# Patient Record
Sex: FEMALE | Race: BLACK OR AFRICAN AMERICAN | Employment: OTHER | URBAN - METROPOLITAN AREA
[De-identification: names, ages, dates, MRNs, and addresses within clinical notes are randomized per-mention and may not be internally consistent; named-entity substitution may affect disease eponyms.]

---

## 2017-04-20 ENCOUNTER — HOSPITAL ENCOUNTER (OUTPATIENT)
Dept: GENERAL RADIOLOGY | Age: 72
Discharge: HOME | End: 2017-04-20
Payer: MEDICARE

## 2017-04-20 DIAGNOSIS — R05.9 COUGH: ICD-10-CM

## 2017-04-20 PROCEDURE — 71020 XR CHEST PA LAT: CPT

## 2017-06-15 ENCOUNTER — HOSPITAL ENCOUNTER (OUTPATIENT)
Dept: MAMMOGRAPHY | Age: 72
Discharge: HOME | End: 2017-06-15
Attending: INTERNAL MEDICINE
Payer: MEDICARE

## 2017-06-15 DIAGNOSIS — Z12.31 VISIT FOR SCREENING MAMMOGRAM: ICD-10-CM

## 2017-06-15 PROCEDURE — 77063 BREAST TOMOSYNTHESIS BI: CPT

## 2017-06-16 ENCOUNTER — HOSPITAL ENCOUNTER (EMERGENCY)
Age: 72
Discharge: HOME | End: 2017-06-16
Attending: EMERGENCY MEDICINE
Payer: MEDICARE

## 2017-06-16 ENCOUNTER — APPOINTMENT (OUTPATIENT)
Dept: CT IMAGING | Age: 72
End: 2017-06-16
Attending: EMERGENCY MEDICINE
Payer: MEDICARE

## 2017-06-16 VITALS
HEART RATE: 74 BPM | OXYGEN SATURATION: 98 % | DIASTOLIC BLOOD PRESSURE: 98 MMHG | BODY MASS INDEX: 34.73 KG/M2 | SYSTOLIC BLOOD PRESSURE: 148 MMHG | TEMPERATURE: 97.8 F | RESPIRATION RATE: 17 BRPM | WEIGHT: 196 LBS | HEIGHT: 63 IN

## 2017-06-16 DIAGNOSIS — K62.5 RECTAL BLEEDING: ICD-10-CM

## 2017-06-16 DIAGNOSIS — R03.0 ELEVATED BLOOD PRESSURE READING: Primary | ICD-10-CM

## 2017-06-16 DIAGNOSIS — K52.9 NONINFECTIOUS GASTROENTERITIS, UNSPECIFIED TYPE: ICD-10-CM

## 2017-06-16 LAB
ALBUMIN SERPL BCP-MCNC: 3.4 G/DL (ref 3.4–5)
ALBUMIN/GLOB SERPL: 0.9 {RATIO} (ref 0.8–1.7)
ALP SERPL-CCNC: 97 U/L (ref 45–117)
ALT SERPL-CCNC: 22 U/L (ref 13–56)
ANION GAP BLD CALC-SCNC: 7 MMOL/L (ref 3–18)
AST SERPL W P-5'-P-CCNC: 14 U/L (ref 15–37)
BASOPHILS # BLD AUTO: 0.1 K/UL (ref 0–0.06)
BASOPHILS # BLD: 1 % (ref 0–2)
BILIRUB SERPL-MCNC: 0.5 MG/DL (ref 0.2–1)
BUN SERPL-MCNC: 16 MG/DL (ref 7–18)
BUN/CREAT SERPL: 15 (ref 12–20)
CALCIUM SERPL-MCNC: 9 MG/DL (ref 8.5–10.1)
CHLORIDE SERPL-SCNC: 105 MMOL/L (ref 100–108)
CO2 SERPL-SCNC: 30 MMOL/L (ref 21–32)
CREAT SERPL-MCNC: 1.1 MG/DL (ref 0.6–1.3)
DIFFERENTIAL METHOD BLD: ABNORMAL
EOSINOPHIL # BLD: 0.4 K/UL (ref 0–0.4)
EOSINOPHIL NFR BLD: 3 % (ref 0–5)
ERYTHROCYTE [DISTWIDTH] IN BLOOD BY AUTOMATED COUNT: 13.2 % (ref 11.6–14.5)
GLOBULIN SER CALC-MCNC: 3.8 G/DL (ref 2–4)
GLUCOSE SERPL-MCNC: 75 MG/DL (ref 74–99)
HCT VFR BLD AUTO: 38 % (ref 35–45)
HGB BLD-MCNC: 12.5 G/DL (ref 12–16)
LYMPHOCYTES # BLD AUTO: 29 % (ref 21–52)
LYMPHOCYTES # BLD: 3.2 K/UL (ref 0.9–3.6)
MCH RBC QN AUTO: 28.6 PG (ref 24–34)
MCHC RBC AUTO-ENTMCNC: 32.9 G/DL (ref 31–37)
MCV RBC AUTO: 87 FL (ref 74–97)
MONOCYTES # BLD: 0.6 K/UL (ref 0.05–1.2)
MONOCYTES NFR BLD AUTO: 6 % (ref 3–10)
NEUTS SEG # BLD: 6.8 K/UL (ref 1.8–8)
NEUTS SEG NFR BLD AUTO: 61 % (ref 40–73)
PLATELET # BLD AUTO: 261 K/UL (ref 135–420)
PMV BLD AUTO: 9.8 FL (ref 9.2–11.8)
POTASSIUM SERPL-SCNC: 4 MMOL/L (ref 3.5–5.5)
PROT SERPL-MCNC: 7.2 G/DL (ref 6.4–8.2)
RBC # BLD AUTO: 4.37 M/UL (ref 4.2–5.3)
SODIUM SERPL-SCNC: 142 MMOL/L (ref 136–145)
WBC # BLD AUTO: 11.1 K/UL (ref 4.6–13.2)

## 2017-06-16 PROCEDURE — 99283 EMERGENCY DEPT VISIT LOW MDM: CPT

## 2017-06-16 PROCEDURE — 74177 CT ABD & PELVIS W/CONTRAST: CPT

## 2017-06-16 PROCEDURE — 96374 THER/PROPH/DIAG INJ IV PUSH: CPT

## 2017-06-16 PROCEDURE — 74011636320 HC RX REV CODE- 636/320: Performed by: EMERGENCY MEDICINE

## 2017-06-16 PROCEDURE — 85025 COMPLETE CBC W/AUTO DIFF WBC: CPT | Performed by: EMERGENCY MEDICINE

## 2017-06-16 PROCEDURE — 80053 COMPREHEN METABOLIC PANEL: CPT | Performed by: EMERGENCY MEDICINE

## 2017-06-16 PROCEDURE — 74011250636 HC RX REV CODE- 250/636: Performed by: EMERGENCY MEDICINE

## 2017-06-16 PROCEDURE — 96361 HYDRATE IV INFUSION ADD-ON: CPT

## 2017-06-16 RX ORDER — SODIUM CHLORIDE 9 MG/ML
500 INJECTION, SOLUTION INTRAVENOUS ONCE
Status: COMPLETED | OUTPATIENT
Start: 2017-06-16 | End: 2017-06-16

## 2017-06-16 RX ORDER — MORPHINE SULFATE 2 MG/ML
4 INJECTION, SOLUTION INTRAMUSCULAR; INTRAVENOUS ONCE
Status: COMPLETED | OUTPATIENT
Start: 2017-06-16 | End: 2017-06-16

## 2017-06-16 RX ORDER — METRONIDAZOLE 500 MG/1
500 TABLET ORAL 2 TIMES DAILY
Qty: 14 TAB | Refills: 0 | Status: SHIPPED | OUTPATIENT
Start: 2017-06-16 | End: 2017-06-23

## 2017-06-16 RX ADMIN — MORPHINE SULFATE 4 MG: 2 INJECTION, SOLUTION INTRAMUSCULAR; INTRAVENOUS at 15:27

## 2017-06-16 RX ADMIN — SODIUM CHLORIDE 500 ML: 900 INJECTION, SOLUTION INTRAVENOUS at 15:27

## 2017-06-16 RX ADMIN — IOPAMIDOL 90 ML: 612 INJECTION, SOLUTION INTRAVENOUS at 16:53

## 2017-06-16 NOTE — ED TRIAGE NOTES
Provider in triage: 66 y o female has rectal bleeding and crampy abd pain for 1 week and lightheaded. Hx diverticulitis with prior colostomy.    Ordered: ct, labs, morphine  DDx: divertic  Sent to (MAIN treatment area, FAST track): main

## 2017-06-16 NOTE — DISCHARGE INSTRUCTIONS
Elevated Blood Pressure: Care Instructions  Your Care Instructions    Blood pressure is a measure of how hard the blood pushes against the walls of your arteries. It's normal for blood pressure to go up and down throughout the day. But if it stays up over time, you have high blood pressure. Two numbers tell you your blood pressure. The first number is the systolic pressure. It shows how hard the blood pushes when your heart is pumping. The second number is the diastolic pressure. It shows how hard the blood pushes between heartbeats, when your heart is relaxed and filling with blood. An ideal blood pressure in adults is less than 120/80 (say \"120 over 80\"). High blood pressure is 140/90 or higher. You have high blood pressure if your top number is 140 or higher or your bottom number is 90 or higher, or both. The main test for high blood pressure is simple, fast, and painless. To diagnose high blood pressure, your doctor will test your blood pressure at different times. After testing your blood pressure, your doctor may ask you to test it again when you are home. If you are diagnosed with high blood pressure, you can work with your doctor to make a long-term plan to manage it. Follow-up care is a key part of your treatment and safety. Be sure to make and go to all appointments, and call your doctor if you are having problems. It's also a good idea to know your test results and keep a list of the medicines you take. How can you care for yourself at home? · Do not smoke. Smoking increases your risk for heart attack and stroke. If you need help quitting, talk to your doctor about stop-smoking programs and medicines. These can increase your chances of quitting for good. · Stay at a healthy weight. · Try to limit how much sodium you eat to less than 2,300 milligrams (mg) a day. Your doctor may ask you to try to eat less than 1,500 mg a day. · Be physically active.  Get at least 30 minutes of exercise on most days of the week. Walking is a good choice. You also may want to do other activities, such as running, swimming, cycling, or playing tennis or team sports. · Avoid or limit alcohol. Talk to your doctor about whether you can drink any alcohol. · Eat plenty of fruits, vegetables, and low-fat dairy products. Eat less saturated and total fats. · Learn how to check your blood pressure at home. When should you call for help? Call your doctor now or seek immediate medical care if:  · Your blood pressure is much higher than normal (such as 180/110 or higher). · You think high blood pressure is causing symptoms such as:  ¨ Severe headache. ¨ Blurry vision. Watch closely for changes in your health, and be sure to contact your doctor if:  · You do not get better as expected. Where can you learn more? Go to http://justoL2Cflakita.info/. Enter W549 in the search box to learn more about \"Elevated Blood Pressure: Care Instructions. \"  Current as of: October 19, 2016  Content Version: 11.2  © 4572-9322 [employer]. Care instructions adapted under license by [employer] (which disclaims liability or warranty for this information). If you have questions about a medical condition or this instruction, always ask your healthcare professional. [address] any warranty or liability for your use of this information. Gastroenteritis: Care Instructions  Your Care Instructions  Gastroenteritis is an illness that may cause nausea, vomiting, and diarrhea. It is sometimes called \"stomach flu. \" It can be caused by bacteria or a virus. You will probably begin to feel better in 1 to 2 days. In the meantime, get plenty of rest and make sure you do not become dehydrated. Dehydration occurs when your body loses too much fluid. Follow-up care is a key part of your treatment and safety.  Be sure to make and go to all appointments, and call your doctor if you are having problems. Its also a good idea to know your test results and keep a list of the medicines you take. How can you care for yourself at home? · If your doctor prescribed antibiotics, take them as directed. Do not stop taking them just because you feel better. You need to take the full course of antibiotics. · Drink plenty of fluids to prevent dehydration, enough so that your urine is light yellow or clear like water. Choose water and other caffeine-free clear liquids until you feel better. If you have kidney, heart, or liver disease and have to limit fluids, talk with your doctor before you increase your fluid intake. · Drink fluids slowly, in frequent, small amounts, because drinking too much too fast can cause vomiting. · Begin eating mild foods, such as dry toast, yogurt, applesauce, bananas, and rice. Avoid spicy, hot, or high-fat foods, and do not drink alcohol or caffeine for a day or two. Do not drink milk or eat ice cream until you are feeling better. How to prevent gastroenteritis  · Keep hot foods hot and cold foods cold. · Do not eat meats, dressings, salads, or other foods that have been kept at room temperature for more than 2 hours. · Use a thermometer to check your refrigerator. It should be between 34°F and 40°F.  · Defrost meats in the refrigerator or microwave, not on the kitchen counter. · Keep your hands and your kitchen clean. Wash your hands, cutting boards, and countertops with hot soapy water frequently. · Cook meat until it is well done. · Do not eat raw eggs or uncooked sauces made with raw eggs. · Do not take chances. If food looks or tastes spoiled, throw it out. When should you call for help? Call 911 anytime you think you may need emergency care. For example, call if:  · You vomit blood or what looks like coffee grounds. · You passed out (lost consciousness). · You pass maroon or very bloody stools.   Call your doctor now or seek immediate medical care if:  · You have severe belly pain. · You have signs of needing more fluids. You have sunken eyes, a dry mouth, and pass only a little dark urine. · You feel like you are going to faint. · You have increased belly pain that does not go away in 1 to 2 days. · You have new or increased nausea, or you are vomiting. · You have a new or higher fever. · Your stools are black and tarlike or have streaks of blood. Watch closely for changes in your health, and be sure to contact your doctor if:  · You are dizzy or lightheaded. · You urinate less than usual, or your urine is dark yellow or brown. · You do not feel better with each day that goes by. Where can you learn more? Go to http://justo-flakita.info/. Enter N142 in the search box to learn more about \"Gastroenteritis: Care Instructions. \"  Current as of: May 24, 2016  Content Version: 11.2  © 3748-3710 [employer]. Care instructions adapted under license by [employer] (which disclaims liability or warranty for this information). If you have questions about a medical condition or this instruction, always ask your healthcare professional. [place] [address] any warranty or liability for your use of this information. Rectal Bleeding: Care Instructions  Your Care Instructions  Rectal bleeding in small amounts is common. You may see red spotting on toilet paper or drops of blood in the toilet. Rectal bleeding has many possible causes, from something as minor as hemorrhoids to something as serious as colon cancer. You may need more tests to find the cause of your bleeding. Follow-up care is a key part of your treatment and safety. Be sure to make and go to all appointments, and call your doctor if you are having problems. Its also a good idea to know your test results and keep a list of the medicines you take. How can you care for yourself at home?   · Avoid aspirin and other nonsteroidal anti-inflammatory drugs (NSAIDs), such as ibuprofen (Advil, Motrin) and naproxen (Aleve). They can cause you to bleed more. Ask your doctor if you can take acetaminophen (Tylenol). Read and follow all instructions on the label. · Use a stool softener that contains bran or psyllium. You can save money by buying bran or psyllium (available in bulk at most health food stores) and sprinkling it on foods or stirring it into fruit juice. You can also use a product such as Metamucil or Citrucel. · Take your medicines exactly as directed. Call your doctor if you think you are having a problem with your medicine. When should you call for help? Call 911 anytime you think you may need emergency care. For example, call if:  · You passed out (lost consciousness). · You pass maroon or very bloody stools. · You vomit blood or what looks like coffee grounds. Call your doctor now or seek immediate medical care if:  · You have severe belly pain. · You have increased bleeding. · You are dizzy or lightheaded, or you feel like you may faint. · Your stools are black and tarlike. Watch closely for changes in your health, and be sure to contact your doctor if:  · You lose weight and do not know why. · You feel tired all the time. · Your bleeding does not decrease after 2 days. Where can you learn more? Go to http://justo-flakita.info/. Enter E776 in the search box to learn more about \"Rectal Bleeding: Care Instructions. \"  Current as of: August 9, 2016  Content Version: 11.2  © 1077-2228 [employer]. Care instructions adapted under license by [employer] (which disclaims liability or warranty for this information). If you have questions about a medical condition or this instruction, always ask your healthcare professional. [address] any warranty or liability for your use of this information.

## 2017-06-16 NOTE — ED PROVIDER NOTES
HPI Comments: 3:16 PM Richard Marie is a 66 y.o. female with history of HTN, diverticulitis with prior colectomy and reversal who presents to the ED c/o abdominal pain which began 1 week ago and intermittent periods of rectal bleeding. Pt also complains of diarrhea and constipation. Pt states she has taken Dulcolax with relief. Pt states she has never experienced these symptoms before. Pt states the only blood thinner she is on is 81mg Aspirin. Pt states she takes her blood pressure medications daily. Pt denies any other symptoms at this time. PCP: Bob Rosales MD      The history is provided by the patient. Past Medical History:   Diagnosis Date    Bell palsy     Cancer ([facility] [address]     abdominal    Cells and casts in urine     Hypertension     Ill-defined condition     suprapubic catheter    Kidney stones     Microscopic hematuria     Mitral valve insufficiency     Mitral valvular insufficiency     Neurogenic bladder     Recurrent UTI     Ureteral stricture     Urinary retention        Past Surgical History:   Procedure Laterality Date    ABDOMEN SURGERY PROC UNLISTED      Wide local excison of abdominal wall mass 7/5/2016    HX APPENDECTOMY      HX COLONOSCOPY      HX COLOSTOMY  2008    HX HYSTERECTOMY  1980's    fibroids    HX UROLOGICAL  2012    suprapubic tube placement         Family History:   Problem Relation Age of Onset    Hypertension Father     Other Brother      falling accident       Social History     Social History    Marital status: SINGLE     Spouse name: N/A    Number of children: N/A    Years of education: N/A     Occupational History    Not on file.      Social History Main Topics    Smoking status: Never Smoker    Smokeless tobacco: Never Used    Alcohol use No    Drug use: No    Sexual activity: Not on file     Other Topics Concern    Not on file     Social History Narrative         ALLERGIES: Ciprofloxacin and Penicillins    Review of Systems Constitutional: Negative for fever. HENT: Negative for congestion. Respiratory: Negative for cough and shortness of breath. Cardiovascular: Negative for chest pain and leg swelling. Gastrointestinal: Positive for abdominal pain, constipation and diarrhea. Negative for nausea and vomiting. Genitourinary: Negative for dysuria. Musculoskeletal: Negative. Neurological: Negative for speech difficulty and headaches. All other systems reviewed and are negative. Vitals:    06/16/17 1449 06/16/17 1530   BP: 170/87 (!) 148/98   Pulse: 75 74   Resp: 18 17   Temp: 97.8 °F (36.6 °C)    SpO2: 96% 98%   Weight: 88.9 kg (196 lb)    Height: 5' 3\" (1.6 m)             Physical Exam   Constitutional: She is oriented to person, place, and time. She appears well-developed and well-nourished. HENT:   Head: Normocephalic and atraumatic. Neck: Neck supple. No JVD present. Cardiovascular: Normal rate and regular rhythm. Pulmonary/Chest: Effort normal and breath sounds normal. No respiratory distress. Abdominal: Soft. She exhibits no distension. There is tenderness. There is guarding. There is no rebound. Diffuse tenderness   Genitourinary: Rectal exam shows guaiac positive stool (brown stool). Musculoskeletal: She exhibits no edema. Neurological: She is alert and oriented to person, place, and time. Skin: Skin is warm and dry. No erythema.    Psychiatric: Judgment normal.        MDM  Number of Diagnoses or Management Options  Elevated blood pressure reading:   Noninfectious gastroenteritis, unspecified type:   Rectal bleeding:   Diagnosis management comments: 67 y/o female presents with abd pain and rectal bleeding    Labs, ct to eval for diverticulitis       Amount and/or Complexity of Data Reviewed  Clinical lab tests: ordered and reviewed  Tests in the radiology section of CPT®: ordered and reviewed      ED Course       Procedures  Vitals:  Patient Vitals for the past 12 hrs:   Temp Pulse Resp BP SpO2   06/16/17 1530 - 74 17 (!) 148/98 98 %   06/16/17 1449 97.8 °F (36.6 °C) 75 18 170/87 96 %        Medications ordered:   Medications   0.9% sodium chloride infusion 500 mL (0 mL IntraVENous IV Completed 6/16/17 1710)   morphine injection 4 mg (4 mg IntraVENous Given 6/16/17 1527)   iopamidol (ISOVUE 300) 61 % contrast injection 90 mL (90 mL IntraVENous Given 6/16/17 1653)         Lab findings:  Recent Results (from the past 12 hour(s))   CBC WITH AUTOMATED DIFF    Collection Time: 06/16/17  3:24 PM   Result Value Ref Range    WBC 11.1 4.6 - 13.2 K/uL    RBC 4.37 4.20 - 5.30 M/uL    HGB 12.5 12.0 - 16.0 g/dL    HCT 38.0 35.0 - 45.0 %    MCV 87.0 74.0 - 97.0 FL    MCH 28.6 24.0 - 34.0 PG    MCHC 32.9 31.0 - 37.0 g/dL    RDW 13.2 11.6 - 14.5 %    PLATELET 875 128 - 255 K/uL    MPV 9.8 9.2 - 11.8 FL    NEUTROPHILS 61 40 - 73 %    LYMPHOCYTES 29 21 - 52 %    MONOCYTES 6 3 - 10 %    EOSINOPHILS 3 0 - 5 %    BASOPHILS 1 0 - 2 %    ABS. NEUTROPHILS 6.8 1.8 - 8.0 K/UL    ABS. LYMPHOCYTES 3.2 0.9 - 3.6 K/UL    ABS. MONOCYTES 0.6 0.05 - 1.2 K/UL    ABS. EOSINOPHILS 0.4 0.0 - 0.4 K/UL    ABS. BASOPHILS 0.1 (H) 0.0 - 0.06 K/UL    DF AUTOMATED     METABOLIC PANEL, COMPREHENSIVE    Collection Time: 06/16/17  3:24 PM   Result Value Ref Range    Sodium 142 136 - 145 mmol/L    Potassium 4.0 3.5 - 5.5 mmol/L    Chloride 105 100 - 108 mmol/L    CO2 30 21 - 32 mmol/L    Anion gap 7 3.0 - 18 mmol/L    Glucose 75 74 - 99 mg/dL    BUN 16 7.0 - 18 MG/DL    Creatinine 1.10 0.6 - 1.3 MG/DL    BUN/Creatinine ratio 15 12 - 20      GFR est AA 59 (L) >60 ml/min/1.73m2    GFR est non-AA 49 (L) >60 ml/min/1.73m2    Calcium 9.0 8.5 - 10.1 MG/DL    Bilirubin, total 0.5 0.2 - 1.0 MG/DL    ALT (SGPT) 22 13 - 56 U/L    AST (SGOT) 14 (L) 15 - 37 U/L    Alk.  phosphatase 97 45 - 117 U/L    Protein, total 7.2 6.4 - 8.2 g/dL    Albumin 3.4 3.4 - 5.0 g/dL    Globulin 3.8 2.0 - 4.0 g/dL    A-G Ratio 0.9 0.8 - 1.7         X-Ray, CT or other radiology findings or impressions:  CT ABD PELV W CONT   Final Result      1. There is mild colonic wall thickening of the transverse colon with  pericolonic stranding. This may represent focal colitis. It is unclear whether  this is the cause of the patient's rectal bleeding. GI consultation advised.     Low-attenuation seen adjacent to the right heart border possibly a pericardial  cyst. Several bladder calculi present. Reevaluation, Progress notes, Consult notes, or additional Procedure notes:   Discussed results with pt, will place on flagyl, pt reports cipro allergy. Follow up with colorectal surgery. Discussed return precautions. Pt noted to have elevated BP. Pt is asymptomatic and was referred to PCP for follow up. Disposition:  Diagnosis:   1. Elevated blood pressure reading    2. Noninfectious gastroenteritis, unspecified type    3. Rectal bleeding        Disposition: discharged    Follow-up Information     Follow up With Details Comments Contact Info    Barron Franklin MD Call As needed [address] [address] [address] Tonio Dey MD Schedule an appointment as soon as possible for a visit  [facility] [address]  [facility] [id number]  [phone]      [facility] EMERGENCY DEPT  As needed, If symptoms worsen [address] [facility] [address].  [phone]           Discharge Medication List as of 6/16/2017  6:34 PM      CONTINUE these medications which have NOT CHANGED    Details   ibuprofen (ADVIL) 200 mg tablet 200 mg., Historical Med      multivitamin (ONE A DAY) tablet Take by Mouth Once a Day. , Historical Med      valsartan (DIOVAN) 160 mg tablet 160 mg., Historical Med      traMADol (ULTRAM) 50 mg tablet Historical Med      HYDROcodone-acetaminophen (XODOL) 7.5-300 mg tablet Take 1 Tab by mouth four (4) times daily as needed. Max Daily Amount: 4 Tabs., Print, Disp-20 Tab, R-0      diltiazem CD (CARDIZEM CD) 240 mg ER capsule Take  by mouth daily. , Historical Med      losartan (COZAAR) 100 mg tablet Take 100 mg by mouth daily. On hold while taking Bactrim DS, Historical Med      MULTIVITS-MIN/FA/CA CARB/VIT K (ONE-A-DAY WOMEN'S 50+ PO) Take  by mouth., Historical Med      aspirin 81 mg tablet Take 81 mg by mouth daily. , Historical Med               Scribe Attestation  Brittaney Cuello scribing for and in the presence of Valerie Talavera DO (06/16/17)      Physician Attestation  I personally performed the services described in this documentation, reviewed and edited the documentation which was dictated to the scribe in my presence, and it accurately records my words and actions.     Valerie Talavera DO (06/16/17)      Signed by: Lionel Edouard, June 16, 2017 at 3:27 PM

## 2017-07-10 ENCOUNTER — OFFICE VISIT (OUTPATIENT)
Dept: SURGERY | Age: 72
End: 2017-07-10

## 2017-07-10 VITALS
BODY MASS INDEX: 33.13 KG/M2 | WEIGHT: 187 LBS | TEMPERATURE: 97.7 F | DIASTOLIC BLOOD PRESSURE: 83 MMHG | OXYGEN SATURATION: 95 % | HEIGHT: 63 IN | RESPIRATION RATE: 18 BRPM | SYSTOLIC BLOOD PRESSURE: 143 MMHG | HEART RATE: 79 BPM

## 2017-07-10 DIAGNOSIS — K52.9 COLITIS, NONSPECIFIC: Primary | ICD-10-CM

## 2017-07-10 DIAGNOSIS — K62.5 RECTAL BLEEDING: ICD-10-CM

## 2017-07-10 NOTE — PATIENT INSTRUCTIONS
If you have any questions or concerns about today's appointment, the verbal and/or written instructions you were given for follow up care, please call our office at [phone].     [facility] - [address]    [phone] office  [phone]

## 2017-07-10 NOTE — MR AVS SNAPSHOT
Visit Information Date & Time Provider Department Dept. Phone Encounter #  
 7/10/2017 10:00 AM MD Ken[facility] [phone] [id number] Your Appointments 7/20/2017  2:00 PM  
Nurse Visit with Vashti Heimlich [place] POD4 NURSE [facility]. [address] ([facility]) Appt Note: â¢SPT tube change with Ham [address]  
  
   
 [facility] [address] [phone]  
  
    
 1/31/2018  9:15 AM  
ESTABLISHED PATIENT with Susana Islas MD  
[facility]. [address] ([facility]) Appt Note: Return in about 1 year (around 2/1/2018) for Cytoscopy, urethral stricture. [address] [id number]  
[phone]  
  
   
 [facility] [phone] Upcoming Health Maintenance Date Due DTaP/Tdap/Td series (1 - Tdap) 10/11/1965 FOBT Q 1 YEAR AGE 50-75 10/11/1994 ZOSTER VACCINE AGE 60> 10/11/2004 GLAUCOMA SCREENING Q2Y 10/11/2009 Pneumococcal 65+ Low/Medium Risk (1 of 2 - PCV13) 10/11/2009 MEDICARE YEARLY EXAM 10/11/2009 INFLUENZA AGE 9 TO ADULT 8/1/2017 BREAST CANCER SCRN MAMMOGRAM 6/15/2019 Allergies as of 7/10/2017  Review Complete On: 7/10/2017 By: Jcarlos Fall LPN Severity Noted Reaction Type Reactions Ciprofloxacin  05/29/2008    Unknown (comments), Other (comments) Penicillins  09/04/2007    Unknown (comments), Other (comments) Current Immunizations  Never Reviewed No immunizations on file. Not reviewed this visit Vitals BP Pulse Temp Resp Height(growth percentile) Weight(growth percentile) 143/83 79 97.7 °F (36.5 °C) (Oral) 18 5' 3\" (1.6 m) 187 lb (84.8 kg) SpO2 BMI OB Status Smoking Status 95% 33.13 kg/m2 Postmenopausal Never Smoker Vitals History BMI and BSA Data Body Mass Index Body Surface Area 33.13 kg/m 2 1.94 m 2 Preferred Pharmacy Pharmacy Name Phone [facility], [address] [place] [address] [phone] Your Updated Medication List  
  
   
This list is accurate as of: 7/10/17 10:40 AM.  Always use your most recent med list. ADVIL 200 mg tablet Generic drug:  ibuprofen  
200 mg.  
  
 aspirin 81 mg tablet Take 81 mg by mouth daily. CARDIZEM  mg ER capsule Generic drug:  dilTIAZem CD Take  by mouth daily. DIOVAN 160 mg tablet Generic drug:  valsartan  
160 mg. HYDROcodone-acetaminophen 7.5-300 mg tablet Commonly known as:  Carmen Sarks Take 1 Tab by mouth four (4) times daily as needed. Max Daily Amount: 4 Tabs. losartan 100 mg tablet Commonly known as:  COZAAR Take 100 mg by mouth daily. On hold while taking Bactrim DS  
  
 multivitamin tablet Commonly known as:  ONE A DAY Take by Mouth Once a Day. ONE-A-DAY WOMEN'S 50+ PO Take  by mouth. traMADol 50 mg tablet Commonly known as:  ULTRAM  
  
  
  
  
Patient Instructions If you have any questions or concerns about today's appointment, the verbal and/or written instructions you were given for follow up care, please call our office at [phone]. [facility] [place] [phone] office [phone]! Dear Zeke Mcmanus: 
Thank you for requesting a [employer] account. Our records indicate that you have previously registered for a [employer] account but its currently inactive. Please call our [employer] support line at [phone]. Additional Information If you have questions, please visit the Frequently Asked Questions section of the [employer] website at [email or web address] Remember, [employer] is NOT to be used for urgent needs. For medical emergencies, dial 911. Now available from your iPhone and Android! Please provide this summary of care documentation to your next provider. Your primary care clinician is listed as Darryl Zhu. If you have any questions after today's visit, please call [phone].

## 2017-07-10 NOTE — PROGRESS NOTES
[facility]  Colon and Rectal Surgery  [address] [address], [address] [place] [address]                Colon and Rectal Surgery    Subjective:      Yang Albarran is a 66 y.o. female who is referred by Dr. Christa Santos. The patient presented to ED on 6/16/2017 with one week history of persistent abdominal pain, profuse bright red rectal bleeding and constipation. Her initial evaluation with CT scan showed mild colonic wall thickening of the transverse colon with pericolonic stranding possibly indicating focal colitis. The pain and the bleeding did slowly resolve. She still has constipation. The patient otherwise denies any anorectal pain, fever, weight changes, nor any nausea, emesis. She does have reflux symptoms. The patient is status post what sounds like Sudhakar's procedure in 2959 for complicated diverticulitis. She underwent reversal later that year. She has not had any episodes of abdominal pain nor bleeding until this time. The patient underwent colonoscopy by Dr. Alvaro Eisenmenger on 12/10/2015 with removal of small adenomatous polyps and findings of diverticulosis coli, internal hemorrhoids and intact colorectal anastomosis at 15 cm. The family history is negative for colon cancer/polyps, other GI malignancies, nor inflammatory bowel diseases.          Patient Active Problem List    Diagnosis Date Noted    Stricture, urethra 12/11/2015    Bladder stones 12/11/2015    Neurogenic bladder 09/05/2012    Recurrent UTI 09/05/2012     Past Medical History:   Diagnosis Date    Bell palsy     Cancer ([facility] [address]     abdominal    Cells and casts in urine     Hypertension     Ill-defined condition     suprapubic catheter    Kidney stones     Microscopic hematuria     Mitral valve insufficiency     Mitral valvular insufficiency     Neurogenic bladder     Recurrent UTI     Ureteral stricture     Urinary retention       Past Surgical History:   Procedure Laterality Date    ABDOMEN SURGERY PROC UNLISTED      Wide local excison of abdominal wall mass 7/5/2016    HX APPENDECTOMY      HX COLONOSCOPY      HX COLOSTOMY  2008    HX HYSTERECTOMY  1980's    fibroids    HX UROLOGICAL  2012    suprapubic tube placement      Social History   Substance Use Topics    Smoking status: Never Smoker    Smokeless tobacco: Never Used    Alcohol use No      Family History   Problem Relation Age of Onset    Hypertension Father     Other Brother      falling accident      Prior to Admission medications    Medication Sig Start Date End Date Taking? Authorizing Provider   diltiazem CD (CARDIZEM CD) 240 mg ER capsule Take  by mouth daily. Yes Historical Provider   losartan (COZAAR) 100 mg tablet Take 100 mg by mouth daily. On hold while taking Bactrim DS   Yes Historical Provider   MULTIVITS-MIN/FA/CA CARB/VIT K (ONE-A-DAY WOMEN'S 50+ PO) Take  by mouth. Yes Historical Provider   aspirin 81 mg tablet Take 81 mg by mouth daily. Yes Historical Provider   ibuprofen (ADVIL) 200 mg tablet 200 mg. Historical Provider   multivitamin (ONE A DAY) tablet Take by Mouth Once a Day. Historical Provider   valsartan (DIOVAN) 160 mg tablet 160 mg. Historical Provider   traMADol Lucrezia Grisel) 50 mg tablet  11/18/16   Historical Provider   HYDROcodone-acetaminophen (XODOL) 7.5-300 mg tablet Take 1 Tab by mouth four (4) times daily as needed. Max Daily Amount: 4 Tabs. 7/5/16   Celestino Lane MD     Current Outpatient Prescriptions   Medication Sig    diltiazem CD (CARDIZEM CD) 240 mg ER capsule Take  by mouth daily.  losartan (COZAAR) 100 mg tablet Take 100 mg by mouth daily. On hold while taking Bactrim DS    MULTIVITS-MIN/FA/CA CARB/VIT K (ONE-A-DAY WOMEN'S 50+ PO) Take  by mouth.  aspirin 81 mg tablet Take 81 mg by mouth daily.  ibuprofen (ADVIL) 200 mg tablet 200 mg.    multivitamin (ONE A DAY) tablet Take by Mouth Once a Day.      valsartan (DIOVAN) 160 mg tablet 160 mg.    traMADol (ULTRAM) 50 mg tablet     HYDROcodone-acetaminophen (XODOL) 7.5-300 mg tablet Take 1 Tab by mouth four (4) times daily as needed. Max Daily Amount: 4 Tabs. No current facility-administered medications for this visit. Allergies   Allergen Reactions    Ciprofloxacin Unknown (comments) and Other (comments)    Penicillins Unknown (comments) and Other (comments)       Review of Systems:    A comprehensive review of systems was negative except for that written in the History of Present Illness. Objective:        Visit Vitals    /83    Pulse 79    Temp 97.7 °F (36.5 °C) (Oral)    Resp 18    Ht 5' 3\" (1.6 m)    Wt 84.8 kg (187 lb)    SpO2 95%    BMI 33.13 kg/m2       Physical Exam:   GENERAL: alert, cooperative, no distress, appears stated age  LYMPHATIC: Cervical, supraclavicular, and axillary nodes normal.   THROAT & NECK: normal and no erythema or exudates noted. LUNG: clear to auscultation bilaterally  HEART: regular rate and rhythm, S1, S2 normal, no murmur, click, rub or gallop  ABDOMEN: soft, non-tender. Bowel sounds normal. No masses,  no organomegaly  EXTREMITIES:  extremities normal, atraumatic, no cyanosis or edema  SKIN: Normal.  NEUROLOGIC: negative  PSYCHIATRIC: non focal       Imaging:  reviewed  CT of Abdomen and Pelvis (6/16/2017)  IMPRESSION:  1. There is mild colonic wall thickening of the transverse colon with  pericolonic stranding. This may represent focal colitis. It is unclear whether  this is the cause of the patient's rectal bleeding. GI consultation advised. 2. Low-attenuation seen adjacent to the right heart border possibly a pericardial  cyst. Several bladder calculi present. Assessment:     Abnormal CT scan findings with history of abdominal pain and rectal bleeding. Plan:     I recommend proceeding with expedient colonoscopy exam.  The patient was in full agreement.     The risks of colonoscopy were discussed including colon injury/perforation, anesthesia issues, bleeding, and the possibility of incomplete examination. The patient was willing to accept these risks and proceed with the examination. The findings will be discussed with the patient and further treatment will be dependent on hte endoscopic findings. Thank you for allowing me to participate in the patient's care.         Dyllan Luo MD, FACS, FASCRS  Colon and Rectal Surgery  [facility]  Office [phone]  Fax     [phone]  7/10/2017  10:15 AM

## 2017-07-17 ENCOUNTER — TELEPHONE (OUTPATIENT)
Dept: SURGERY | Age: 72
End: 2017-07-17

## 2017-07-17 RX ORDER — POLYETHYLENE GLYCOL 3350, SODIUM SULFATE ANHYDROUS, SODIUM BICARBONATE, SODIUM CHLORIDE, POTASSIUM CHLORIDE 236; 22.74; 6.74; 5.86; 2.97 G/4L; G/4L; G/4L; G/4L; G/4L
POWDER, FOR SOLUTION ORAL
Qty: 1 BOTTLE | Refills: 0 | Status: SHIPPED | OUTPATIENT
Start: 2017-07-17 | End: 2019-02-19

## 2017-07-28 ENCOUNTER — HOSPITAL ENCOUNTER (OUTPATIENT)
Age: 72
Setting detail: OUTPATIENT SURGERY
Discharge: HOME | End: 2017-07-28
Attending: COLON & RECTAL SURGERY | Admitting: COLON & RECTAL SURGERY
Payer: MEDICARE

## 2017-07-28 VITALS
RESPIRATION RATE: 16 BRPM | SYSTOLIC BLOOD PRESSURE: 125 MMHG | BODY MASS INDEX: 32.12 KG/M2 | TEMPERATURE: 98.1 F | DIASTOLIC BLOOD PRESSURE: 54 MMHG | OXYGEN SATURATION: 93 % | HEART RATE: 80 BPM | HEIGHT: 63 IN | WEIGHT: 181.31 LBS

## 2017-07-28 PROCEDURE — 74011250636 HC RX REV CODE- 250/636: Performed by: COLON & RECTAL SURGERY

## 2017-07-28 PROCEDURE — 99152 MOD SED SAME PHYS/QHP 5/>YRS: CPT | Performed by: COLON & RECTAL SURGERY

## 2017-07-28 PROCEDURE — 77030031670 HC DEV INFL ENDOTEK BIG60 MRTM -B: Performed by: COLON & RECTAL SURGERY

## 2017-07-28 PROCEDURE — 76040000019: Performed by: COLON & RECTAL SURGERY

## 2017-07-28 PROCEDURE — 74011250636 HC RX REV CODE- 250/636

## 2017-07-28 RX ORDER — EPINEPHRINE 0.1 MG/ML
1 INJECTION INTRACARDIAC; INTRAVENOUS
Status: CANCELLED | OUTPATIENT
Start: 2017-07-28 | End: 2017-07-28

## 2017-07-28 RX ORDER — SODIUM CHLORIDE 9 MG/ML
25 INJECTION, SOLUTION INTRAVENOUS CONTINUOUS
Status: DISCONTINUED | OUTPATIENT
Start: 2017-07-28 | End: 2017-07-28 | Stop reason: HOSPADM

## 2017-07-28 RX ORDER — ATROPINE SULFATE 0.1 MG/ML
0.5 INJECTION INTRAVENOUS
Status: CANCELLED | OUTPATIENT
Start: 2017-07-28 | End: 2017-07-28

## 2017-07-28 RX ORDER — FLUMAZENIL 0.1 MG/ML
0.2 INJECTION INTRAVENOUS
Status: CANCELLED | OUTPATIENT
Start: 2017-07-28 | End: 2017-07-28

## 2017-07-28 RX ORDER — SODIUM CHLORIDE 9 MG/ML
75 INJECTION, SOLUTION INTRAVENOUS CONTINUOUS
Status: CANCELLED | OUTPATIENT
Start: 2017-07-28 | End: 2017-07-28

## 2017-07-28 RX ORDER — DEXTROMETHORPHAN/PSEUDOEPHED 2.5-7.5/.8
1.2 DROPS ORAL
Status: CANCELLED | OUTPATIENT
Start: 2017-07-28

## 2017-07-28 RX ORDER — NALOXONE HYDROCHLORIDE 0.4 MG/ML
0.4 INJECTION, SOLUTION INTRAMUSCULAR; INTRAVENOUS; SUBCUTANEOUS
Status: CANCELLED | OUTPATIENT
Start: 2017-07-28 | End: 2017-07-28

## 2017-07-28 RX ORDER — MIDAZOLAM HYDROCHLORIDE 5 MG/ML
INJECTION INTRAMUSCULAR; INTRAVENOUS AS NEEDED
Status: DISCONTINUED | OUTPATIENT
Start: 2017-07-28 | End: 2017-07-28 | Stop reason: HOSPADM

## 2017-07-28 RX ORDER — MIDAZOLAM HYDROCHLORIDE 1 MG/ML
.25-5 INJECTION, SOLUTION INTRAMUSCULAR; INTRAVENOUS
Status: CANCELLED | OUTPATIENT
Start: 2017-07-28 | End: 2017-07-28

## 2017-07-28 RX ORDER — MIDAZOLAM HYDROCHLORIDE 1 MG/ML
INJECTION, SOLUTION INTRAMUSCULAR; INTRAVENOUS
Status: DISCONTINUED
Start: 2017-07-28 | End: 2017-07-28 | Stop reason: HOSPADM

## 2017-07-28 RX ORDER — SODIUM CHLORIDE 0.9 % (FLUSH) 0.9 %
5-10 SYRINGE (ML) INJECTION AS NEEDED
Status: CANCELLED | OUTPATIENT
Start: 2017-07-28 | End: 2017-07-28

## 2017-07-28 RX ORDER — SODIUM CHLORIDE 0.9 % (FLUSH) 0.9 %
5-10 SYRINGE (ML) INJECTION EVERY 8 HOURS
Status: CANCELLED | OUTPATIENT
Start: 2017-07-28 | End: 2017-07-28

## 2017-07-28 NOTE — PROCEDURES
Colonoscopy Procedure Note      Marissa Enriquez  1945  [id number]                Date of Procedure: 7/28/2017    Indications:  Abnormal CT scan of colon, rule out colitis. Preoperative diagnosis: Abnormal CT scan of colon, rule out colitis. Postoperative diagnosis: Normal colorectal mucosa with intact anastomosis, Mild diverticulosis    Title of Procedure:  Colonoscopy, daignostic    :  Wilbert Rubio MD    Assistant(s): Endoscopy Technician-1: Horacio Plasencia  Endoscopy RN-1: Pratibha Gutierres RN  Endoscopy RN-2: Jimenez Sumner RN    Referring Source:  Jone Castillo MD    Sedation:  Demerol 50 mg IV,  Versed 4 mg IV      ASA Class: ASA 3 - Severe systemic disease but compensated        Procedure Details:    Prior to the procedure, a history and physical were performed. The patients medications, allergies and sensitivities were reviewed and all questions were answered. After informed consent was obtained for the procedure, with all risks and benefits of procedure explained. The patient was taken to the endoscopy suite and placed in the left lateral decubitus position. Patient identification and proposed procedure were verified prior to the procedure by the nurse and I. Following the  satisfactory administration of sedation,  the anus was inspected and appeared within normal limits. Digital rectal examination revealed Normal sphincter tone and squeeze pressure. Palpation revealed No Masses. Next the Olympus video colonoscope was introduced through the anus and advanced to cecum, which was identified by the ileocecal valve and appendiceal orifice, terminal ileum. The quality of preparation was good. The terminal ileum was visualized. The colonoscope was then slowly withdrawn and the mucosa carefully examined for any abnormalities. Cecal withdrawl time was greater than six minutes. The patient tolerated the procedure well.       Findings:   Rectum: normal with intact anastomosis at 15 cm  Sigmoid: surgically absent  Descending Colon: mild diverticulosis;  Transverse Colon: normal  Ascending Colon: normal  Cecum: normal  Terminal Ileum: normal    Other: No evidence of colitis    Interventions:  none    Specimen Removed: * No specimens in log *     Complications: None. EBL:  None. Impression:    Normal colorectal mucosa with intact anastomosis, Mild diverticulosis      Recommendations: -Repeat colonoscopy in 5 years. Resume normal medication(s). Discharge Disposition:  Home in the company of a  when able to ambulate.         Cody Conn MD, FACS, FASCRS  Colon and Rectal Surgery  [facility]  Office [phone]  Fax     [phone]  7/28/2017  11:09 AM       [facility]  [address] [place] [address]

## 2017-07-28 NOTE — IP AVS SNAPSHOT
Home 
 
 
 [address] 
[phone] Patient: Gilles Hendricks MRN: [id number] :1945 You are allergic to the following Allergen Reactions Ciprofloxacin Unknown (comments) Other (comments) Penicillins Unknown (comments) Other (comments) Recent Documentation Height Weight BMI OB Status Smoking Status 1.6 m 82.2 kg 32.12 kg/m2 Postmenopausal Never Smoker Emergency Contacts Name Discharge Info Relation Home Work Mobile Meche Abdul DISCHARGE CAREGIVER [3] Other Relative [6] [phone] FrankoChelsea NO [2] Mother [14] [phone] Ruchi Leonard DISCHARGE CAREGIVER [3] Other Relative [6] [phone] About your hospitalization You were admitted on:  2017 [facility] You were discharged on:  2017 Unit phone number:  [phone] Why you were hospitalized Your primary diagnosis was:  Not on File Providers Seen During Your Hospitalizations Provider Role Specialty Primary office phone Maira Diaz MD Attending Provider Colon and Rectal Surgery [phone] Your Primary Care Physician (PCP) Primary Care Physician Office Phone Office Fax Myesha Baltazar [phone] Follow-up Information Follow up With Details Comments Contact Info Doc MD Laurita   [address] [facility] [address] [address] [phone] Maira Diaz MD  Please contact Dr Segundo Dent for any questions [address] [id number] [phone] Your Appointments Thursday August 10, 2017  1:30 PM EDT Nurse Visit with [facility] POD4 NURSE [facility]. Jorge Hutton [address] ([address]) [address] [facility] [address]  
[phone] Current Discharge Medication List  
  
 CONTINUE these medications which have NOT CHANGED Dose & Instructions Dispensing Information Comments Morning Noon Evening Bedtime ADVIL 200 mg tablet Generic drug:  ibuprofen Your last dose was: Your next dose is:    
   
   
 Dose:  200 mg  
200 mg. Refills:  0  
     
   
   
   
  
 aspirin 81 mg tablet Your last dose was: Your next dose is:    
   
   
 Dose:  81 mg Take 81 mg by mouth daily. Refills:  0 CARDIZEM  mg ER capsule Generic drug:  dilTIAZem CD Your last dose was: Your next dose is: Take  by mouth daily. Refills:  0  
     
   
   
   
  
 DIOVAN 160 mg tablet Generic drug:  valsartan Your last dose was: Your next dose is:    
   
   
 Dose:  160 mg  
160 mg. Refills:  0 HYDROcodone-acetaminophen 7.5-300 mg tablet Commonly known as:  Quintella Ryan Your last dose was: Your next dose is:    
   
   
 Dose:  1 Tab Take 1 Tab by mouth four (4) times daily as needed. Max Daily Amount: 4 Tabs. Quantity:  20 Tab Refills:  0  
     
   
   
   
  
 losartan 100 mg tablet Commonly known as:  COZAAR Your last dose was: Your next dose is:    
   
   
 Dose:  100 mg Take 100 mg by mouth daily. On hold while taking Bactrim DS Refills:  0  
     
   
   
   
  
 multivitamin tablet Commonly known as:  ONE A DAY Your last dose was: Your next dose is: Take by Mouth Once a Day. Refills:  0  
     
   
   
   
  
 ONE-A-DAY WOMEN'S 50+ PO Your last dose was: Your next dose is: Take  by mouth. Refills:  0  
     
   
   
   
  
 PEG 3350-Electrolytes 236-22.74-6.74 -5.86 gram suspension Commonly known as:  GO-LYTELY Your last dose was: Your next dose is: Take as directed for bowel prep  Indications: BOWEL EVACUATION Quantity:  1 Bottle Refills:  0  
     
   
   
   
  
 traMADol 50 mg tablet Commonly known as:  ULTRAM  
   
Your last dose was: Your next dose is:    
   
   
  Refills:  0 Discharge Instructions Colonoscopy Discharge Instructions Elsi Dodge [id number] 
1945 COLONOSCOPY FINDINGS: 
Your colonoscopy showed: 1. Mild diverticulosis of the left colon. 2. Otherwise normal examination with intact colorectal anastomosis. 3. No evidence of colitis. FOLLOW UP RECOMMENDATIONS: 
 Dr. Jim Dunham recommends your next colonoscopy in 5 years. DISCOMFORT: 
If you have redness at your IV site- apply warm compress to area; if redness or soreness persist- contact your physician There may be a slight amount of blood passed from the rectum, more than a teaspoon of bright red blood is not expected - contact your physician Gaseous discomfort is common- walking, belching will help relieve any gas pains. If discomfort persist- contact your physician DIET: 
 High fiber diet. ACTIVITY: 
You may resume your normal daily activities, however, it is recommended that you spend the remainder of the day resting - avoiding any strenuous activities. You may not operate a vehicle for 24 hours You may not engage in an occupation involving machinery or appliances for rest of today You may not drink alcoholic beverages for at least 24 hours Avoid making any critical decisions for at least 24 hour CALL M.D. ANY SIGN OF: Increasing pain, nausea, vomiting Abdominal distension (swelling) New increased bleeding Fever or chills Pain in chest area or shortness of breath Anthony Torres MD, FACS, FASCRS Colon and Rectal Surgery [employer] [facility] Office [phone] Fax     [phone] DISCHARGE SUMMARY from Nurse The following personal items are in your possession at time of discharge: Dental Appliances: None PATIENT INSTRUCTIONS: 
 
 
F-face looks uneven A-arms unable to move or move unevenly S-speech slurred or non-existent T-time-call 911 as soon as signs and symptoms begin-DO NOT go Back to bed or wait to see if you get better-TIME IS BRAIN. Warning Signs of HEART ATTACK Call 911 if you have these symptoms: 
? Chest discomfort. Most heart attacks involve discomfort in the center of the chest that lasts more than a few minutes, or that goes away and comes back. It can feel like uncomfortable pressure, squeezing, fullness, or pain. ? Discomfort in other areas of the upper body. Symptoms can include pain or discomfort in one or both arms, the back, neck, jaw, or stomach. ? Shortness of breath with or without chest discomfort. ? Other signs may include breaking out in a cold sweat, nausea, or lightheadedness. Don't wait more than five minutes to call [address]! Fast action can save your life. Calling 911 is almost always the fastest way to get lifesaving treatment. Emergency Medical Services staff can begin treatment when they arrive  up to an hour sooner than if someone gets to the hospital by car. The discharge information has been reviewed with the patient. The patient verbalized understanding. Discharge medications reviewed with the patient and appropriate educational materials and side effects teaching were provided. Patient armband removed and given to patient to take home. Patient was informed of the privacy risks if armband lost or stolen Discharge Orders None Introducing \[facility] [place] [facility]\"" SERVICES! Dear Karen Vega: 
Thank you for requesting a [employer] account.   Our records indicate that you have previously registered for a [employer] account but its currently inactive. Please call our [employer] support line at [phone]. Additional Information If you have questions, please visit the Frequently Asked Questions section of the [employer] website at [email or web address] Remember, MyChart is NOT to be used for urgent needs. For medical emergencies, dial 911. Now available from your iPhone and Android! General Information Please provide this summary of care documentation to your next provider. Patient Signature:  ____________________________________________________________ Date:  ____________________________________________________________  
  
Chao Cart Provider Signature:  ____________________________________________________________ Date:  ____________________________________________________________

## 2017-07-28 NOTE — DISCHARGE INSTRUCTIONS
Colonoscopy Discharge Instructions       Juan Ramon Ponce  [id number]  1945      COLONOSCOPY FINDINGS:  Your colonoscopy showed: 1. Mild diverticulosis of the left colon. 2. Otherwise normal examination with intact colorectal anastomosis. 3. No evidence of colitis. FOLLOW UP RECOMMENDATIONS:   Dr. Rosie Lemus recommends your next colonoscopy in 5 years. DISCOMFORT:  If you have redness at your IV site- apply warm compress to area; if redness or soreness persist- contact your physician  There may be a slight amount of blood passed from the rectum, more than a teaspoon of bright red blood is not expected - contact your physician  Gaseous discomfort is common- walking, belching will help relieve any gas pains. If discomfort persist- contact your physician    DIET:   High fiber diet. ACTIVITY:  You may resume your normal daily activities, however, it is recommended that you spend the remainder of the day resting - avoiding any strenuous activities. You may not operate a vehicle for 24 hours  You may not engage in an occupation involving machinery or appliances for rest of today  You may not drink alcoholic beverages for at least 24 hours  Avoid making any critical decisions for at least 24 hour    CALL M.D.   ANY SIGN OF:   Increasing pain, nausea, vomiting  Abdominal distension (swelling)  New increased bleeding   Fever or chills  Pain in chest area or shortness of breath      Simeon Daly MD, FACS, FASCRS  Colon and Rectal Surgery  [facility]  Office [phone]  Fax     [phone] SUMMARY from Nurse    The following personal items are in your possession at time of discharge:    Dental Appliances: None                               PATIENT INSTRUCTIONS:    After general anesthesia or intravenous sedation, for 24 hours or while taking prescription Narcotics:  · Limit your activities  · Do not drive and operate hazardous machinery  · Do not make important personal or business decisions  · Do  not drink alcoholic beverages  · If you have not urinated within 8 hours after discharge, please contact your surgeon on call. Report the following to your surgeon:  · Excessive pain, swelling, redness or odor of or around the surgical area  · Temperature over 100.5  · Nausea and vomiting lasting longer than 4 hours or if unable to take medications  · Any signs of decreased circulation or nerve impairment to extremity: change in color, persistent  numbness, tingling, coldness or increase pain  · Any questions        What to do at Home:  Recommended activity: Activity as tolerated and no driving for today. *  Please give a list of your current medications to your Primary Care Provider. *  Please update this list whenever your medications are discontinued, doses are      changed, or new medications (including over-the-counter products) are added. *  Please carry medication information at all times in case of emergency situations. These are general instructions for a healthy lifestyle:    No smoking/ No tobacco products/ Avoid exposure to second hand smoke    Surgeon General's Warning:  Quitting smoking now greatly reduces serious risk to your health. Obesity, smoking, and sedentary lifestyle greatly increases your risk for illness    A healthy diet, regular physical exercise & weight monitoring are important for maintaining a healthy lifestyle    You may be retaining fluid if you have a history of heart failure or if you experience any of the following symptoms:  Weight gain of 3 pounds or more overnight or 5 pounds in a week, increased swelling in our hands or feet or shortness of breath while lying flat in bed. Please call your doctor as soon as you notice any of these symptoms; do not wait until your next office visit.     Recognize signs and symptoms of STROKE:    F-face looks uneven    A-arms unable to move or move unevenly    S-speech slurred or non-existent    T-time-call 911 as soon as signs and symptoms begin-DO NOT go       Back to bed or wait to see if you get better-TIME IS BRAIN. Warning Signs of HEART ATTACK     Call 911 if you have these symptoms:   Chest discomfort. Most heart attacks involve discomfort in the center of the chest that lasts more than a few minutes, or that goes away and comes back. It can feel like uncomfortable pressure, squeezing, fullness, or pain.  Discomfort in other areas of the upper body. Symptoms can include pain or discomfort in one or both arms, the back, neck, jaw, or stomach.  Shortness of breath with or without chest discomfort.  Other signs may include breaking out in a cold sweat, nausea, or lightheadedness. Don't wait more than five minutes to call 911 - MINUTES MATTER! Fast action can save your life. Calling 911 is almost always the fastest way to get lifesaving treatment. Emergency Medical Services staff can begin treatment when they arrive -- up to an hour sooner than if someone gets to the hospital by car. The discharge information has been reviewed with the patient. The patient verbalized understanding. Discharge medications reviewed with the patient and appropriate educational materials and side effects teaching were provided. Patient armband removed and given to patient to take home.   Patient was informed of the privacy risks if armband lost or stolen

## 2017-07-28 NOTE — INTERVAL H&P NOTE
H&P Update:  Elsi Dodge was seen and examined. History and physical has been reviewed. The patient has been examined.  There have been no significant clinical changes since the completion of the originally dated History and Physical.    Signed By: Raheel Coe MD     July 28, 2017 10:04 AM

## 2017-07-28 NOTE — H&P (VIEW-ONLY)
[facility]  Colon and Rectal Surgery  [address] [address], [address] [place] [address]                Colon and Rectal Surgery    Subjective:      William Ackerman is a 66 y.o. female who is referred by Dr. Monica Tony. The patient presented to ED on 6/16/2017 with one week history of persistent abdominal pain, profuse bright red rectal bleeding and constipation. Her initial evaluation with CT scan showed mild colonic wall thickening of the transverse colon with pericolonic stranding possibly indicating focal colitis. The pain and the bleeding did slowly resolve. She still has constipation. The patient otherwise denies any anorectal pain, fever, weight changes, nor any nausea, emesis. She does have reflux symptoms. The patient is status post what sounds like Sudhakar's procedure in 7830 for complicated diverticulitis. She underwent reversal later that year. She has not had any episodes of abdominal pain nor bleeding until this time. The patient underwent colonoscopy by Dr. Karthikeyan Chang on 12/10/2015 with removal of small adenomatous polyps and findings of diverticulosis coli, internal hemorrhoids and intact colorectal anastomosis at 15 cm. The family history is negative for colon cancer/polyps, other GI malignancies, nor inflammatory bowel diseases.          Patient Active Problem List    Diagnosis Date Noted    Stricture, urethra 12/11/2015    Bladder stones 12/11/2015    Neurogenic bladder 09/05/2012    Recurrent UTI 09/05/2012     Past Medical History:   Diagnosis Date    Bell palsy     Cancer ([facility] [address]     abdominal    Cells and casts in urine     Hypertension     Ill-defined condition     suprapubic catheter    Kidney stones     Microscopic hematuria     Mitral valve insufficiency     Mitral valvular insufficiency     Neurogenic bladder     Recurrent UTI     Ureteral stricture     Urinary retention       Past Surgical History:   Procedure Laterality Date    ABDOMEN SURGERY PROC UNLISTED      Wide local excison of abdominal wall mass 7/5/2016    HX APPENDECTOMY      HX COLONOSCOPY      HX COLOSTOMY  2008    HX HYSTERECTOMY  1980's    fibroids    HX UROLOGICAL  2012    suprapubic tube placement      Social History   Substance Use Topics    Smoking status: Never Smoker    Smokeless tobacco: Never Used    Alcohol use No      Family History   Problem Relation Age of Onset    Hypertension Father     Other Brother      falling accident      Prior to Admission medications    Medication Sig Start Date End Date Taking? Authorizing Provider   diltiazem CD (CARDIZEM CD) 240 mg ER capsule Take  by mouth daily. Yes Historical Provider   losartan (COZAAR) 100 mg tablet Take 100 mg by mouth daily. On hold while taking Bactrim DS   Yes Historical Provider   MULTIVITS-MIN/FA/CA CARB/VIT K (ONE-A-DAY WOMEN'S 50+ PO) Take  by mouth. Yes Historical Provider   aspirin 81 mg tablet Take 81 mg by mouth daily. Yes Historical Provider   ibuprofen (ADVIL) 200 mg tablet 200 mg. Historical Provider   multivitamin (ONE A DAY) tablet Take by Mouth Once a Day. Historical Provider   valsartan (DIOVAN) 160 mg tablet 160 mg. Historical Provider   traMADol Nena Gaurav) 50 mg tablet  11/18/16   Historical Provider   HYDROcodone-acetaminophen (XODOL) 7.5-300 mg tablet Take 1 Tab by mouth four (4) times daily as needed. Max Daily Amount: 4 Tabs. 7/5/16   Kendal Cohen MD     Current Outpatient Prescriptions   Medication Sig    diltiazem CD (CARDIZEM CD) 240 mg ER capsule Take  by mouth daily.  losartan (COZAAR) 100 mg tablet Take 100 mg by mouth daily. On hold while taking Bactrim DS    MULTIVITS-MIN/FA/CA CARB/VIT K (ONE-A-DAY WOMEN'S 50+ PO) Take  by mouth.  aspirin 81 mg tablet Take 81 mg by mouth daily.  ibuprofen (ADVIL) 200 mg tablet 200 mg.    multivitamin (ONE A DAY) tablet Take by Mouth Once a Day.      valsartan (DIOVAN) 160 mg tablet 160 mg.    traMADol (ULTRAM) 50 mg tablet     HYDROcodone-acetaminophen (XODOL) 7.5-300 mg tablet Take 1 Tab by mouth four (4) times daily as needed. Max Daily Amount: 4 Tabs. No current facility-administered medications for this visit. Allergies   Allergen Reactions    Ciprofloxacin Unknown (comments) and Other (comments)    Penicillins Unknown (comments) and Other (comments)       Review of Systems:    A comprehensive review of systems was negative except for that written in the History of Present Illness. Objective:        Visit Vitals    /83    Pulse 79    Temp 97.7 °F (36.5 °C) (Oral)    Resp 18    Ht 5' 3\" (1.6 m)    Wt 84.8 kg (187 lb)    SpO2 95%    BMI 33.13 kg/m2       Physical Exam:   GENERAL: alert, cooperative, no distress, appears stated age  LYMPHATIC: Cervical, supraclavicular, and axillary nodes normal.   THROAT & NECK: normal and no erythema or exudates noted. LUNG: clear to auscultation bilaterally  HEART: regular rate and rhythm, S1, S2 normal, no murmur, click, rub or gallop  ABDOMEN: soft, non-tender. Bowel sounds normal. No masses,  no organomegaly  EXTREMITIES:  extremities normal, atraumatic, no cyanosis or edema  SKIN: Normal.  NEUROLOGIC: negative  PSYCHIATRIC: non focal       Imaging:  reviewed  CT of Abdomen and Pelvis (6/16/2017)  IMPRESSION:  1. There is mild colonic wall thickening of the transverse colon with  pericolonic stranding. This may represent focal colitis. It is unclear whether  this is the cause of the patient's rectal bleeding. GI consultation advised. 2. Low-attenuation seen adjacent to the right heart border possibly a pericardial  cyst. Several bladder calculi present. Assessment:     Abnormal CT scan findings with history of abdominal pain and rectal bleeding. Plan:     I recommend proceeding with expedient colonoscopy exam.  The patient was in full agreement.     The risks of colonoscopy were discussed including colon injury/perforation, anesthesia issues, bleeding, and the possibility of incomplete examination. The patient was willing to accept these risks and proceed with the examination. The findings will be discussed with the patient and further treatment will be dependent on hte endoscopic findings. Thank you for allowing me to participate in the patient's care.         Marysol Groves MD, FACS, [facility]  Colon and Rectal Surgery  [facility]  Office [phone]  Fax     [phone]  7/10/2017  10:15 AM

## 2018-03-26 ENCOUNTER — HOSPITAL ENCOUNTER (OUTPATIENT)
Dept: GENERAL RADIOLOGY | Age: 73
Discharge: HOME | End: 2018-03-26
Attending: INTERNAL MEDICINE
Payer: MEDICARE

## 2018-03-26 DIAGNOSIS — R05.9 COUGH: ICD-10-CM

## 2018-03-26 PROCEDURE — 71046 X-RAY EXAM CHEST 2 VIEWS: CPT

## 2018-10-15 ENCOUNTER — HOSPITAL ENCOUNTER (OUTPATIENT)
Dept: MAMMOGRAPHY | Age: 73
Discharge: HOME | End: 2018-10-15
Attending: INTERNAL MEDICINE
Payer: MEDICARE

## 2018-10-15 ENCOUNTER — HOSPITAL ENCOUNTER (OUTPATIENT)
Dept: CT IMAGING | Age: 73
Discharge: HOME | End: 2018-10-15
Attending: INTERNAL MEDICINE
Payer: MEDICARE

## 2018-10-15 DIAGNOSIS — Z12.31 VISIT FOR SCREENING MAMMOGRAM: ICD-10-CM

## 2018-10-15 DIAGNOSIS — J84.9 INTERSTITIAL LUNG DISEASE (HCC): ICD-10-CM

## 2018-10-15 PROCEDURE — 71250 CT THORAX DX C-: CPT

## 2018-10-15 PROCEDURE — 77063 BREAST TOMOSYNTHESIS BI: CPT

## 2020-05-28 ENCOUNTER — APPOINTMENT (OUTPATIENT)
Dept: VASCULAR SURGERY | Age: 75
End: 2020-05-28
Attending: PHYSICIAN ASSISTANT
Payer: MEDICARE

## 2020-05-28 ENCOUNTER — HOSPITAL ENCOUNTER (EMERGENCY)
Age: 75
Discharge: HOME | End: 2020-05-28
Attending: EMERGENCY MEDICINE
Payer: MEDICARE

## 2020-05-28 ENCOUNTER — APPOINTMENT (OUTPATIENT)
Dept: CT IMAGING | Age: 75
End: 2020-05-28
Attending: PHYSICIAN ASSISTANT
Payer: MEDICARE

## 2020-05-28 ENCOUNTER — APPOINTMENT (OUTPATIENT)
Dept: GENERAL RADIOLOGY | Age: 75
End: 2020-05-28
Attending: EMERGENCY MEDICINE
Payer: MEDICARE

## 2020-05-28 VITALS
WEIGHT: 160 LBS | RESPIRATION RATE: 31 BRPM | TEMPERATURE: 98 F | DIASTOLIC BLOOD PRESSURE: 89 MMHG | OXYGEN SATURATION: 99 % | SYSTOLIC BLOOD PRESSURE: 151 MMHG | HEART RATE: 78 BPM | BODY MASS INDEX: 28.35 KG/M2 | HEIGHT: 63 IN

## 2020-05-28 DIAGNOSIS — M25.531 BILATERAL WRIST PAIN: Primary | ICD-10-CM

## 2020-05-28 DIAGNOSIS — M25.532 BILATERAL WRIST PAIN: Primary | ICD-10-CM

## 2020-05-28 DIAGNOSIS — M79.605 ACUTE LEG PAIN, LEFT: ICD-10-CM

## 2020-05-28 LAB
ALBUMIN SERPL-MCNC: 3.1 G/DL (ref 3.4–5)
ALBUMIN/GLOB SERPL: 0.8 {RATIO} (ref 0.8–1.7)
ALP SERPL-CCNC: 105 U/L (ref 45–117)
ALT SERPL-CCNC: 15 U/L (ref 13–56)
ANION GAP SERPL CALC-SCNC: 11 MMOL/L (ref 3–18)
AST SERPL-CCNC: 15 U/L (ref 10–38)
ATRIAL RATE: 69 BPM
BASOPHILS # BLD: 0 K/UL (ref 0–0.1)
BASOPHILS NFR BLD: 0 % (ref 0–2)
BILIRUB SERPL-MCNC: 0.7 MG/DL (ref 0.2–1)
BUN SERPL-MCNC: 12 MG/DL (ref 7–18)
BUN/CREAT SERPL: 11 (ref 12–20)
CALCIUM SERPL-MCNC: 8.9 MG/DL (ref 8.5–10.1)
CALCULATED P AXIS, ECG09: 37 DEGREES
CALCULATED R AXIS, ECG10: 46 DEGREES
CALCULATED T AXIS, ECG11: 39 DEGREES
CHLORIDE SERPL-SCNC: 106 MMOL/L (ref 100–111)
CK MB CFR SERPL CALC: 2.8 % (ref 0–4)
CK MB SERPL-MCNC: 1.4 NG/ML (ref 5–25)
CK SERPL-CCNC: 50 U/L (ref 26–192)
CO2 SERPL-SCNC: 21 MMOL/L (ref 21–32)
CREAT SERPL-MCNC: 1.06 MG/DL (ref 0.6–1.3)
DIAGNOSIS, 93000: NORMAL
DIFFERENTIAL METHOD BLD: ABNORMAL
EOSINOPHIL # BLD: 0 K/UL (ref 0–0.4)
EOSINOPHIL NFR BLD: 0 % (ref 0–5)
ERYTHROCYTE [DISTWIDTH] IN BLOOD BY AUTOMATED COUNT: 13 % (ref 11.6–14.5)
GLOBULIN SER CALC-MCNC: 3.8 G/DL (ref 2–4)
GLUCOSE SERPL-MCNC: 117 MG/DL (ref 74–99)
HCT VFR BLD AUTO: 36.3 % (ref 35–45)
HGB BLD-MCNC: 11.8 G/DL (ref 12–16)
LYMPHOCYTES # BLD: 1.3 K/UL (ref 0.9–3.6)
LYMPHOCYTES NFR BLD: 14 % (ref 21–52)
MCH RBC QN AUTO: 27.2 PG (ref 24–34)
MCHC RBC AUTO-ENTMCNC: 32.5 G/DL (ref 31–37)
MCV RBC AUTO: 83.6 FL (ref 74–97)
MONOCYTES # BLD: 0.5 K/UL (ref 0.05–1.2)
MONOCYTES NFR BLD: 5 % (ref 3–10)
NEUTS SEG # BLD: 7.1 K/UL (ref 1.8–8)
NEUTS SEG NFR BLD: 81 % (ref 40–73)
P-R INTERVAL, ECG05: 164 MS
PLATELET # BLD AUTO: 285 K/UL (ref 135–420)
PMV BLD AUTO: 9.8 FL (ref 9.2–11.8)
POTASSIUM SERPL-SCNC: 3.7 MMOL/L (ref 3.5–5.5)
PROT SERPL-MCNC: 6.9 G/DL (ref 6.4–8.2)
Q-T INTERVAL, ECG07: 418 MS
QRS DURATION, ECG06: 86 MS
QTC CALCULATION (BEZET), ECG08: 447 MS
RBC # BLD AUTO: 4.34 M/UL (ref 4.2–5.3)
SODIUM SERPL-SCNC: 138 MMOL/L (ref 136–145)
TROPONIN I SERPL-MCNC: <0.02 NG/ML (ref 0–0.04)
VENTRICULAR RATE, ECG03: 69 BPM
WBC # BLD AUTO: 8.9 K/UL (ref 4.6–13.2)

## 2020-05-28 PROCEDURE — 93971 EXTREMITY STUDY: CPT

## 2020-05-28 PROCEDURE — 96375 TX/PRO/DX INJ NEW DRUG ADDON: CPT

## 2020-05-28 PROCEDURE — 80053 COMPREHEN METABOLIC PANEL: CPT

## 2020-05-28 PROCEDURE — 93005 ELECTROCARDIOGRAM TRACING: CPT

## 2020-05-28 PROCEDURE — 70450 CT HEAD/BRAIN W/O DYE: CPT

## 2020-05-28 PROCEDURE — 82553 CREATINE MB FRACTION: CPT

## 2020-05-28 PROCEDURE — 96374 THER/PROPH/DIAG INJ IV PUSH: CPT

## 2020-05-28 PROCEDURE — 99284 EMERGENCY DEPT VISIT MOD MDM: CPT

## 2020-05-28 PROCEDURE — 74011250636 HC RX REV CODE- 250/636: Performed by: PHYSICIAN ASSISTANT

## 2020-05-28 PROCEDURE — 85025 COMPLETE CBC W/AUTO DIFF WBC: CPT

## 2020-05-28 PROCEDURE — 71045 X-RAY EXAM CHEST 1 VIEW: CPT

## 2020-05-28 RX ORDER — MORPHINE SULFATE 4 MG/ML
4 INJECTION, SOLUTION INTRAMUSCULAR; INTRAVENOUS
Status: COMPLETED | OUTPATIENT
Start: 2020-05-28 | End: 2020-05-28

## 2020-05-28 RX ORDER — PREDNISONE 20 MG/1
20 TABLET ORAL DAILY
Qty: 5 TAB | Refills: 0 | Status: SHIPPED | OUTPATIENT
Start: 2020-05-28 | End: 2020-06-02

## 2020-05-28 RX ORDER — NALOXONE HYDROCHLORIDE 0.4 MG/ML
0.4 INJECTION, SOLUTION INTRAMUSCULAR; INTRAVENOUS; SUBCUTANEOUS AS NEEDED
Status: DISCONTINUED | OUTPATIENT
Start: 2020-05-28 | End: 2020-05-28 | Stop reason: HOSPADM

## 2020-05-28 RX ORDER — HYDROCODONE BITARTRATE AND ACETAMINOPHEN 5; 325 MG/1; MG/1
1 TABLET ORAL
Qty: 10 TAB | Refills: 0 | Status: SHIPPED | OUTPATIENT
Start: 2020-05-28 | End: 2020-05-31

## 2020-05-28 RX ADMIN — MORPHINE SULFATE 4 MG: 4 INJECTION, SOLUTION INTRAMUSCULAR; INTRAVENOUS at 12:40

## 2020-05-28 RX ADMIN — MORPHINE SULFATE 4 MG: 4 INJECTION, SOLUTION INTRAMUSCULAR; INTRAVENOUS at 14:03

## 2020-05-28 NOTE — ED PROVIDER NOTES
EMERGENCY DEPARTMENT HISTORY AND PHYSICAL EXAM    Date: 5/28/2020  Patient Name: Apolonia Chang    History of Presenting Illness     Chief Complaint   Patient presents with    Generalized Body Aches         History Provided By: Patient    Chief Complaint: Diffuse body pain, bilateral wrist pain and left knee pain  Duration: Months however at 6 AM this morning she noticed the pain was worse  Timing: Gradual worsening  Location: Bilateral wrist, worse on the left than right, left knee  Quality: Allergies   Severity: 6 out of 10  Modifying Factors: Worse when walking  Associated Symptoms: none       Additional History (Context): Apolonia Chang is a 75 y.o. female with a history of hypertension and Bell's palsy who presents today for history as listed above. Patient states she feels her diffuse body aches and pains are secondary to \"old age\". Denies similarities to prior episodes of influenza. Reports that she has known tendinitis in her left and right thumb, but denies history of carpal tunnel syndrome. Patient reports her left knee is bothering her more than anything. Reports she has had 2000 mg of Tylenol today that did not help. Denies known history of arthritis in that knee or knee replacement. Patient denies any extremity weakness, slurred speech or facial droop. Patient states she feels her inability to walk normally on the left leg is secondary to pain. Denies any falls, trauma or injury. Denies any fevers or chills. Denies any chest pain, shortness of breath, dizziness, lightheadedness, syncope, abdominal pain, nausea, vomiting, diarrhea or constipation. Patient asking to go home multiple times throughout history.       PCP: Damian Schreiber MD    Current Facility-Administered Medications   Medication Dose Route Frequency Provider Last Rate Last Dose    naloxone (NARCAN) injection 0.4 mg  0.4 mg IntraVENous PRN ZOE Jaeger         Current Outpatient Medications   Medication Sig Dispense Refill    HYDROcodone-acetaminophen (Norco) 5-325 mg per tablet Take 1 Tab by mouth every six (6) hours as needed for Pain for up to 3 days. Max Daily Amount: 4 Tabs. 10 Tab 0    predniSONE (DELTASONE) 20 mg tablet Take 20 mg by mouth daily for 5 days. With Breakfast 5 Tab 0    montelukast (SINGULAIR) 10 mg tablet       hydrALAZINE (APRESOLINE) 50 mg tablet       omeprazole (PRILOSEC) 20 mg capsule Take 20 mg by mouth daily.  ibuprofen (ADVIL) 200 mg tablet 200 mg.      multivitamin (ONE A DAY) tablet Take by Mouth Once a Day.  traMADol (ULTRAM) 50 mg tablet       diltiazem CD (CARDIZEM CD) 240 mg ER capsule Take  by mouth daily.  MULTIVITS-MIN/FA/CA CARB/VIT K (ONE-A-DAY WOMEN'S 50+ PO) Take  by mouth.  aspirin 81 mg tablet Take 81 mg by mouth daily.          Past History     Past Medical History:  Past Medical History:   Diagnosis Date    Bell palsy     Cancer ([facility] [address]     abdominal    Cells and casts in urine     Hypertension     Ill-defined condition     suprapubic catheter    Kidney stones     Microscopic hematuria     Mitral valve insufficiency     Mitral valvular insufficiency     Neurogenic bladder     Recurrent UTI     Ureteral stricture     Urinary retention        Past Surgical History:  Past Surgical History:   Procedure Laterality Date    ABDOMEN SURGERY PROC UNLISTED      Wide local excison of abdominal wall mass 7/5/2016    COLONOSCOPY N/A 7/28/2017    COL[facility] by [facility]    HX APPENDECTOMY      HX COLONOSCOPY      HX COLOSTOMY  2008    HX HYSTERECTOMY  1980's    fibroids    HX UROLOGICAL  2012    suprapubic tube placement       Family History:  Family History   Problem Relation Age of Onset    Hypertension Father     Other Brother         falling accident       Social History:  Social History     Tobacco Use    Smoking status: Never Smoker    Smokeless tobacco: Never Used   Substance Use Topics    Alcohol use: No    Drug use: No       Allergies: Allergies   Allergen Reactions    Losartan Cough    Ciprofloxacin Unknown (comments) and Other (comments)    Penicillins Unknown (comments) and Other (comments)         Review of Systems   Review of Systems   Constitutional: Negative for chills and fever. HENT: Negative for congestion, rhinorrhea and sore throat. Respiratory: Negative for cough and shortness of breath. Cardiovascular: Negative for chest pain. Gastrointestinal: Negative for abdominal pain, blood in stool, constipation, diarrhea, nausea and vomiting. Genitourinary: Negative for dysuria, frequency and hematuria. Musculoskeletal: Positive for arthralgias and myalgias. Negative for back pain. Skin: Negative for rash and wound. Neurological: Negative for dizziness and headaches. All other systems reviewed and are negative. All Other Systems Negative  Physical Exam     Vitals:    05/28/20 1400 05/28/20 1500 05/28/20 1545 05/28/20 1615   BP: 173/57 161/82 174/79 164/90   Pulse: 74 80 77 84   Resp:       Temp:       SpO2: 99% 99% 98% 99%   Weight:       Height:         Physical Exam  Vitals signs and nursing note reviewed. Constitutional:       General: She is not in acute distress. Appearance: She is well-developed. She is not diaphoretic. HENT:      Head: Normocephalic and atraumatic. Eyes:      Conjunctiva/sclera: Conjunctivae normal.   Neck:      Musculoskeletal: Normal range of motion and neck supple. Cardiovascular:      Rate and Rhythm: Normal rate and regular rhythm. Heart sounds: Normal heart sounds. Pulmonary:      Effort: Pulmonary effort is normal. No respiratory distress. Breath sounds: Normal breath sounds. Chest:      Chest wall: No tenderness. Abdominal:      General: Bowel sounds are normal. There is no distension. Palpations: Abdomen is soft. Tenderness: There is no abdominal tenderness. There is no guarding or rebound. Musculoskeletal:         General: No deformity. Right wrist: She exhibits tenderness. Left wrist: She exhibits tenderness. Left knee: She exhibits bony tenderness (diffuse). She exhibits normal range of motion, no swelling, no effusion, no ecchymosis, no deformity, normal alignment and normal meniscus. Tenderness found. Skin:     General: Skin is warm and dry. Neurological:      General: No focal deficit present. Mental Status: She is alert and oriented to person, place, and time. GCS: GCS eye subscore is 4. GCS verbal subscore is 5. GCS motor subscore is 6. Cranial Nerves: Cranial nerves are intact. Sensory: Sensation is intact. Motor: Motor function is intact. No atrophy or pronator drift. Coordination: Coordination is intact. Coordination normal.      Deep Tendon Reflexes: Reflexes are normal and symmetric. Comments: Patient unwilling to lift left lower extremity stating it is too painful, Strong DP and PT pulses    Psychiatric:         Mood and Affect: Mood is anxious. Comments: Very anxious            Diagnostic Study Results     Labs -     Recent Results (from the past 12 hour(s))   CARDIAC PANEL,(CK, CKMB & TROPONIN)    Collection Time: 05/28/20 11:14 AM   Result Value Ref Range    CK - MB 1.4 <3.6 ng/ml    CK-MB Index 2.8 0.0 - 4.0 %    CK 50 26 - 192 U/L    Troponin-I, QT <0.02 0.0 - 0.045 NG/ML   CBC WITH AUTOMATED DIFF    Collection Time: 05/28/20 11:14 AM   Result Value Ref Range    WBC 8.9 4.6 - 13.2 K/uL    RBC 4.34 4.20 - 5.30 M/uL    HGB 11.8 (L) 12.0 - 16.0 g/dL    HCT 36.3 35.0 - 45.0 %    MCV 83.6 74.0 - 97.0 FL    MCH 27.2 24.0 - 34.0 PG    MCHC 32.5 31.0 - 37.0 g/dL    RDW 13.0 11.6 - 14.5 %    PLATELET 041 890 - 793 K/uL    MPV 9.8 9.2 - 11.8 FL    NEUTROPHILS 81 (H) 40 - 73 %    LYMPHOCYTES 14 (L) 21 - 52 %    MONOCYTES 5 3 - 10 %    EOSINOPHILS 0 0 - 5 %    BASOPHILS 0 0 - 2 %    ABS. NEUTROPHILS 7.1 1.8 - 8.0 K/UL    ABS. LYMPHOCYTES 1.3 0.9 - 3.6 K/UL    ABS. MONOCYTES 0.5 0.05 - 1.2 K/UL    ABS. EOSINOPHILS 0.0 0.0 - 0.4 K/UL    ABS. BASOPHILS 0.0 0.0 - 0.1 K/UL    DF AUTOMATED     METABOLIC PANEL, COMPREHENSIVE    Collection Time: 05/28/20 11:14 AM   Result Value Ref Range    Sodium 138 136 - 145 mmol/L    Potassium 3.7 3.5 - 5.5 mmol/L    Chloride 106 100 - 111 mmol/L    CO2 21 21 - 32 mmol/L    Anion gap 11 3.0 - 18 mmol/L    Glucose 117 (H) 74 - 99 mg/dL    BUN 12 7.0 - 18 MG/DL    Creatinine 1.06 0.6 - 1.3 MG/DL    BUN/Creatinine ratio 11 (L) 12 - 20      GFR est AA >60 >60 ml/min/1.73m2    GFR est non-AA 51 (L) >60 ml/min/1.73m2    Calcium 8.9 8.5 - 10.1 MG/DL    Bilirubin, total 0.7 0.2 - 1.0 MG/DL    ALT (SGPT) 15 13 - 56 U/L    AST (SGOT) 15 10 - 38 U/L    Alk. phosphatase 105 45 - 117 U/L    Protein, total 6.9 6.4 - 8.2 g/dL    Albumin 3.1 (L) 3.4 - 5.0 g/dL    Globulin 3.8 2.0 - 4.0 g/dL    A-G Ratio 0.8 0.8 - 1.7     EKG, 12 LEAD, INITIAL    Collection Time: 05/28/20 12:01 PM   Result Value Ref Range    Ventricular Rate 69 BPM    Atrial Rate 69 BPM    P-R Interval 164 ms    QRS Duration 86 ms    Q-T Interval 418 ms    QTC Calculation (Bezet) 447 ms    Calculated P Axis 37 degrees    Calculated R Axis 46 degrees    Calculated T Axis 39 degrees    Diagnosis       Normal sinus rhythm  Normal ECG  When compared with ECG of 05-JUL-2016 12:46,  No significant change was found         Radiologic Studies -   CT HEAD WO CONT   Final Result   IMPRESSION:      No acute intracranial abnormalities. Chronic findings as above. XR CHEST PORT   Final Result   IMPRESSION:      No acute radiographic cardiopulmonary abnormality. CT Results  (Last 48 hours)               05/28/20 1349  CT HEAD WO CONT Final result    Impression:  IMPRESSION:       No acute intracranial abnormalities. Chronic findings as above.        Narrative:  EXAM: CT HEAD WO CONT       CLINICAL INDICATION/HISTORY: Left leg decreased strength COMPARISON: None. TECHNIQUE: Axial CT imaging of the head was performed without intravenous   contrast. Sagittal and coronal reconstructions are provided. One or more dose   reduction techniques were used on this CT: automated exposure control,   adjustment of the mAs and/or kVp according to patient size, and iterative   reconstruction techniques. The specific techniques used on this CT exam have   been documented in the patient's electronic medical record. Digital Imaging and   Communications in Medicine (DICOM) format image data are available to   nonaffiliated external healthcare facilities or entities on a secure, media   free, reciprocally searchable basis with patient authorization for at least a   12-month period after this study           _______________       FINDINGS:       BRAIN AND POSTERIOR FOSSA: Mild generalized cortical atrophy. There is no   intracranial hemorrhage, mass effect, or midline shift. There are scattered and confluent foci of decreased attenuation in the   periventricular white matter which are nonspecific but most likely sequelae of   chronic microvascular disease. EXTRA-AXIAL SPACES AND MENINGES: There are no abnormal extra-axial fluid   collections. CALVARIUM: Intact. SINUSES: Clear. OTHER: Intracranial vascular calcifications are noted. _______________               CXR Results  (Last 48 hours)               05/28/20 1224  XR CHEST PORT Final result    Impression:  IMPRESSION:       No acute radiographic cardiopulmonary abnormality.         Narrative:  EXAM: XR CHEST PORT       CLINICAL INDICATION/HISTORY: sob   -Additional: CT chest 10/15/2018       COMPARISON: Several prior exams, most recently March 26, 2018       TECHNIQUE: Frontal view of the chest       _______________       FINDINGS:       HEART AND MEDIASTINUM: Stable appearing cardiac size and mediastinal contours       LUNGS AND PLEURAL SPACES: No focal pneumonic consolidation, pneumothorax, or   pleural effusion. BONY THORAX AND SOFT TISSUES: No acute osseous abnormality       _______________                   Medical Decision Making   I am the first provider for this patient. I reviewed the vital signs, available nursing notes, past medical history, past surgical history, family history and social history. Vital Signs-Reviewed the patient's vital signs. Records Reviewed: Nursing Notes and Old Medical Records     Procedures: None   Procedures    Provider Notes (Medical Decision Making):     Differential: Arthritis, RA, OA, CTS, CVA/TIA, dehydration, electrolyte abnormality, UTI, ACS, pneumonia    Plan: We will order CT of head and cardiac work-up. Have discussed more concerns for arthritis, however will order full work-up. Patient requesting to go home multiple times, however does agree to stay for work-up. Will order pain medication and reassess. 2:06 PM  Patient now stating morphine did not help her bialteral thumb pain, positive Phalen's test, have agreed to give 1 more dose of pain medication. Patient diffusely tender throughout entire left lower extremity, will order Doppler study to rule out DVT. Remaining work-up thus far is reassuring with no acute findings. CT of head was normal.    4:31 PM  Ultrasound was negative for DVT. Will discharge home with pain medication and short course of steroids. Will discharge home with bilateral wrist splints to sleep and at night. Have discussed with patient the importance of follow-up with PCP as well as orthopedics. Return precautions have been given. Patient requesting to go home, will discharge home.       MED RECONCILIATION:  Current Facility-Administered Medications   Medication Dose Route Frequency    naloxone (NARCAN) injection 0.4 mg  0.4 mg IntraVENous PRN     Current Outpatient Medications   Medication Sig    HYDROcodone-acetaminophen (Norco) 5-325 mg per tablet Take 1 Tab by mouth every six (6) hours as needed for Pain for up to 3 days. Max Daily Amount: 4 Tabs.  predniSONE (DELTASONE) 20 mg tablet Take 20 mg by mouth daily for 5 days. With Breakfast    montelukast (SINGULAIR) 10 mg tablet     hydrALAZINE (APRESOLINE) 50 mg tablet     omeprazole (PRILOSEC) 20 mg capsule Take 20 mg by mouth daily.  ibuprofen (ADVIL) 200 mg tablet 200 mg.    multivitamin (ONE A DAY) tablet Take by Mouth Once a Day.  traMADol (ULTRAM) 50 mg tablet     diltiazem CD (CARDIZEM CD) 240 mg ER capsule Take  by mouth daily.  MULTIVITS-MIN/FA/CA CARB/VIT K (ONE-A-DAY WOMEN'S 50+ PO) Take  by mouth.  aspirin 81 mg tablet Take 81 mg by mouth daily. Disposition:  Home     DISCHARGE NOTE:   Pt has been reexamined. Patient has no new complaints, changes, or physical findings. Care plan outlined and precautions discussed. Results of workup were reviewed with the patient. All medications were reviewed with the patient. All of pt's questions and concerns were addressed. Patient was instructed and agrees to follow up with PCP/ortho as well as to return to the ED upon further deterioration. Patient is ready to go home. Follow-up Information     Follow up With Specialties Details Why Contact Info    [facility] EMERGENCY DEPT Emergency Medicine  As needed [address] [place] [address]    Florecita Vásquez MD Geriatric Medicine   [address]  [address] [address]  [phone]      [facility] , [facility]  Schedule an appointment as soon as possible for a visit  [address] [place] [address]  [phone]          Current Discharge Medication List      START taking these medications    Details   HYDROcodone-acetaminophen (Norco) 5-325 mg per tablet Take 1 Tab by mouth every six (6) hours as needed for Pain for up to 3 days. Max Daily Amount: 4 Tabs.   Qty: 10 Tab, Refills: 0    Associated Diagnoses: Bilateral wrist pain; Acute leg pain, left      predniSONE (DELTASONE) 20 mg tablet Take 20 mg by mouth daily for 5 days. With Breakfast  Qty: 5 Tab, Refills: 0                 Diagnosis     Clinical Impression:   1. Bilateral wrist pain    2. Acute leg pain, left          \"Please note that this dictation was completed with [employer], the computer voice recognition software. Quite often unanticipated grammatical, syntax, homophones, and other interpretive errors are inadvertently transcribed by the computer software. Please disregard these errors. Please excuse any errors that have escaped final proofreading. \"

## 2020-05-28 NOTE — ED TRIAGE NOTES
States she feels achy all over  When asked does it feel like the flu she states no she just feels bad.   Patient is very worked up hyperventilating and sweaty   Oral temp is 98.5

## 2020-05-28 NOTE — ED NOTES
Pt seems calm when this nurse is not directly rendering care to her and she does not see me, and as soon as she sees me, she is complaining of pain.

## 2020-05-28 NOTE — DISCHARGE INSTRUCTIONS

## 2020-05-28 NOTE — ED NOTES
[facility] # [phone] - [phone] (cell), [phone] (home)

## 2020-07-22 ENCOUNTER — HOSPITAL ENCOUNTER (OUTPATIENT)
Dept: MAMMOGRAPHY | Age: 75
Discharge: HOME | End: 2020-07-22
Attending: INTERNAL MEDICINE
Payer: MEDICARE

## 2020-07-22 DIAGNOSIS — Z12.31 VISIT FOR SCREENING MAMMOGRAM: ICD-10-CM

## 2020-07-22 PROCEDURE — 77067 SCR MAMMO BI INCL CAD: CPT

## 2020-09-29 ENCOUNTER — HOSPITAL ENCOUNTER (INPATIENT)
Dept: NUCLEAR MEDICINE | Age: 75
Discharge: HOME | DRG: 698 | End: 2020-09-29
Attending: EMERGENCY MEDICINE
Payer: MEDICARE

## 2020-09-29 ENCOUNTER — APPOINTMENT (OUTPATIENT)
Dept: CT IMAGING | Age: 75
DRG: 698 | End: 2020-09-29
Attending: EMERGENCY MEDICINE
Payer: MEDICARE

## 2020-09-29 ENCOUNTER — APPOINTMENT (OUTPATIENT)
Dept: GENERAL RADIOLOGY | Age: 75
DRG: 698 | End: 2020-09-29
Attending: EMERGENCY MEDICINE
Payer: MEDICARE

## 2020-09-29 ENCOUNTER — HOSPITAL ENCOUNTER (INPATIENT)
Age: 75
LOS: 6 days | Discharge: HOME | DRG: 698 | End: 2020-10-05
Attending: EMERGENCY MEDICINE | Admitting: INTERNAL MEDICINE
Payer: MEDICARE

## 2020-09-29 DIAGNOSIS — N17.9 ACUTE RENAL FAILURE, UNSPECIFIED ACUTE RENAL FAILURE TYPE (HCC): Primary | ICD-10-CM

## 2020-09-29 DIAGNOSIS — R06.02 SOB (SHORTNESS OF BREATH): ICD-10-CM

## 2020-09-29 PROBLEM — D75.839 THROMBOCYTOSIS: Status: ACTIVE | Noted: 2020-09-29

## 2020-09-29 PROBLEM — D64.9 SYMPTOMATIC ANEMIA: Status: ACTIVE | Noted: 2020-09-29

## 2020-09-29 PROBLEM — R79.89 ELEVATED D-DIMER: Status: ACTIVE | Noted: 2020-09-29

## 2020-09-29 PROBLEM — N39.0 UTI (URINARY TRACT INFECTION): Status: ACTIVE | Noted: 2020-09-29

## 2020-09-29 LAB
ALBUMIN SERPL-MCNC: 2.3 G/DL (ref 3.4–5)
ALBUMIN/GLOB SERPL: 0.5 {RATIO} (ref 0.8–1.7)
ALP SERPL-CCNC: 66 U/L (ref 45–117)
ALT SERPL-CCNC: 14 U/L (ref 13–56)
AMORPH CRY URNS QL MICRO: ABNORMAL
ANION GAP SERPL CALC-SCNC: 11 MMOL/L (ref 3–18)
ANION GAP SERPL CALC-SCNC: 8 MMOL/L (ref 3–18)
APPEARANCE UR: ABNORMAL
AST SERPL-CCNC: 19 U/L (ref 10–38)
BACTERIA URNS QL MICRO: ABNORMAL /HPF
BASOPHILS # BLD: 0 K/UL (ref 0–0.1)
BASOPHILS NFR BLD: 0 % (ref 0–2)
BILIRUB SERPL-MCNC: 0.7 MG/DL (ref 0.2–1)
BILIRUB UR QL: NEGATIVE
BUN SERPL-MCNC: 49 MG/DL (ref 7–18)
BUN SERPL-MCNC: 50 MG/DL (ref 7–18)
BUN/CREAT SERPL: 15 (ref 12–20)
BUN/CREAT SERPL: 15 (ref 12–20)
CALCIUM SERPL-MCNC: 8 MG/DL (ref 8.5–10.1)
CALCIUM SERPL-MCNC: 8.5 MG/DL (ref 8.5–10.1)
CHLORIDE SERPL-SCNC: 103 MMOL/L (ref 100–111)
CHLORIDE SERPL-SCNC: 103 MMOL/L (ref 100–111)
CK MB CFR SERPL CALC: NORMAL % (ref 0–4)
CK MB SERPL-MCNC: <1 NG/ML (ref 5–25)
CK SERPL-CCNC: 53 U/L (ref 26–192)
CO2 SERPL-SCNC: 22 MMOL/L (ref 21–32)
CO2 SERPL-SCNC: 25 MMOL/L (ref 21–32)
COLOR UR: YELLOW
CREAT SERPL-MCNC: 3.18 MG/DL (ref 0.6–1.3)
CREAT SERPL-MCNC: 3.39 MG/DL (ref 0.6–1.3)
D DIMER PPP FEU-MCNC: 7.58 UG/ML(FEU)
DIFFERENTIAL METHOD BLD: ABNORMAL
EOSINOPHIL # BLD: 0 K/UL (ref 0–0.4)
EOSINOPHIL NFR BLD: 0 % (ref 0–5)
EPITH CASTS URNS QL MICRO: ABNORMAL /LPF (ref 0–5)
ERYTHROCYTE [DISTWIDTH] IN BLOOD BY AUTOMATED COUNT: 15.7 % (ref 11.6–14.5)
GLOBULIN SER CALC-MCNC: 4.4 G/DL (ref 2–4)
GLUCOSE SERPL-MCNC: 92 MG/DL (ref 74–99)
GLUCOSE SERPL-MCNC: 95 MG/DL (ref 74–99)
GLUCOSE UR STRIP.AUTO-MCNC: NEGATIVE MG/DL
HCT VFR BLD AUTO: 19.4 % (ref 35–45)
HCT VFR BLD AUTO: 21.9 % (ref 35–45)
HGB BLD-MCNC: 6.1 G/DL (ref 12–16)
HGB BLD-MCNC: 6.9 G/DL (ref 12–16)
HGB UR QL STRIP: ABNORMAL
KETONES UR QL STRIP.AUTO: NEGATIVE MG/DL
LACTATE BLD-SCNC: 1.21 MMOL/L (ref 0.4–2)
LEUKOCYTE ESTERASE UR QL STRIP.AUTO: ABNORMAL
LIPASE SERPL-CCNC: 115 U/L (ref 73–393)
LYMPHOCYTES # BLD: 1.8 K/UL (ref 0.9–3.6)
LYMPHOCYTES NFR BLD: 18 % (ref 21–52)
MAGNESIUM SERPL-MCNC: 1.6 MG/DL (ref 1.6–2.6)
MCH RBC QN AUTO: 26.4 PG (ref 24–34)
MCHC RBC AUTO-ENTMCNC: 31.5 G/DL (ref 31–37)
MCV RBC AUTO: 83.9 FL (ref 74–97)
MONOCYTES # BLD: 0.6 K/UL (ref 0.05–1.2)
MONOCYTES NFR BLD: 6 % (ref 3–10)
NEUTS SEG # BLD: 7.6 K/UL (ref 1.8–8)
NEUTS SEG NFR BLD: 76 % (ref 40–73)
NITRITE UR QL STRIP.AUTO: POSITIVE
PH UR STRIP: 8.5 [PH] (ref 5–8)
PLATELET # BLD AUTO: 458 K/UL (ref 135–420)
PMV BLD AUTO: 9.3 FL (ref 9.2–11.8)
POTASSIUM SERPL-SCNC: 3.4 MMOL/L (ref 3.5–5.5)
POTASSIUM SERPL-SCNC: 3.5 MMOL/L (ref 3.5–5.5)
PROT SERPL-MCNC: 6.7 G/DL (ref 6.4–8.2)
PROT UR STRIP-MCNC: 100 MG/DL
RBC # BLD AUTO: 2.61 M/UL (ref 4.2–5.3)
RBC #/AREA URNS HPF: ABNORMAL /HPF (ref 0–5)
SODIUM SERPL-SCNC: 136 MMOL/L (ref 136–145)
SODIUM SERPL-SCNC: 136 MMOL/L (ref 136–145)
SP GR UR REFRACTOMETRY: 1.01 (ref 1–1.03)
TROPONIN I SERPL-MCNC: <0.02 NG/ML (ref 0–0.04)
TROPONIN I SERPL-MCNC: <0.02 NG/ML (ref 0–0.04)
UROBILINOGEN UR QL STRIP.AUTO: 1 EU/DL (ref 0.2–1)
WBC # BLD AUTO: 10 K/UL (ref 4.6–13.2)
WBC URNS QL MICRO: ABNORMAL /HPF (ref 0–4)

## 2020-09-29 PROCEDURE — 87186 SC STD MICRODIL/AGAR DIL: CPT

## 2020-09-29 PROCEDURE — 85018 HEMOGLOBIN: CPT

## 2020-09-29 PROCEDURE — 83605 ASSAY OF LACTIC ACID: CPT

## 2020-09-29 PROCEDURE — 86970 RBC PRETX INCUBATJ W/CHEMICL: CPT

## 2020-09-29 PROCEDURE — 83735 ASSAY OF MAGNESIUM: CPT

## 2020-09-29 PROCEDURE — 74176 CT ABD & PELVIS W/O CONTRAST: CPT

## 2020-09-29 PROCEDURE — 81001 URINALYSIS AUTO W/SCOPE: CPT

## 2020-09-29 PROCEDURE — 86900 BLOOD TYPING SEROLOGIC ABO: CPT

## 2020-09-29 PROCEDURE — 71045 X-RAY EXAM CHEST 1 VIEW: CPT

## 2020-09-29 PROCEDURE — 86870 RBC ANTIBODY IDENTIFICATION: CPT

## 2020-09-29 PROCEDURE — 83690 ASSAY OF LIPASE: CPT

## 2020-09-29 PROCEDURE — 86901 BLOOD TYPING SEROLOGIC RH(D): CPT

## 2020-09-29 PROCEDURE — 93005 ELECTROCARDIOGRAM TRACING: CPT

## 2020-09-29 PROCEDURE — 86904 BLOOD TYPING PATIENT SERUM: CPT

## 2020-09-29 PROCEDURE — 86976 RBC SERUM PRETX ID DILUTION: CPT

## 2020-09-29 PROCEDURE — 65660000000 HC RM CCU STEPDOWN

## 2020-09-29 PROCEDURE — 80053 COMPREHEN METABOLIC PANEL: CPT

## 2020-09-29 PROCEDURE — 85025 COMPLETE CBC W/AUTO DIFF WBC: CPT

## 2020-09-29 PROCEDURE — 87077 CULTURE AEROBIC IDENTIFY: CPT

## 2020-09-29 PROCEDURE — 87040 BLOOD CULTURE FOR BACTERIA: CPT

## 2020-09-29 PROCEDURE — 99285 EMERGENCY DEPT VISIT HI MDM: CPT

## 2020-09-29 PROCEDURE — 78580 LUNG PERFUSION IMAGING: CPT

## 2020-09-29 PROCEDURE — 86920 COMPATIBILITY TEST SPIN: CPT

## 2020-09-29 PROCEDURE — 85379 FIBRIN DEGRADATION QUANT: CPT

## 2020-09-29 PROCEDURE — 86902 BLOOD TYPE ANTIGEN DONOR EA: CPT

## 2020-09-29 PROCEDURE — 74011000250 HC RX REV CODE- 250: Performed by: EMERGENCY MEDICINE

## 2020-09-29 PROCEDURE — 82550 ASSAY OF CK (CPK): CPT

## 2020-09-29 PROCEDURE — 86880 COOMBS TEST DIRECT: CPT

## 2020-09-29 RX ORDER — SODIUM CHLORIDE 9 MG/ML
250 INJECTION, SOLUTION INTRAVENOUS AS NEEDED
Status: DISCONTINUED | OUTPATIENT
Start: 2020-09-29 | End: 2020-10-05 | Stop reason: HOSPADM

## 2020-09-29 RX ORDER — SODIUM CHLORIDE 9 MG/ML
125 INJECTION, SOLUTION INTRAVENOUS CONTINUOUS
Status: DISCONTINUED | OUTPATIENT
Start: 2020-09-29 | End: 2020-09-30

## 2020-09-29 RX ORDER — IPRATROPIUM BROMIDE AND ALBUTEROL SULFATE 2.5; .5 MG/3ML; MG/3ML
3 SOLUTION RESPIRATORY (INHALATION) ONCE
Status: COMPLETED | OUTPATIENT
Start: 2020-09-29 | End: 2020-09-29

## 2020-09-29 RX ORDER — ACETAMINOPHEN 325 MG/1
650 TABLET ORAL
Status: ACTIVE | OUTPATIENT
Start: 2020-09-29 | End: 2020-09-30

## 2020-09-29 RX ORDER — GUAIFENESIN 600 MG/1
600 TABLET, EXTENDED RELEASE ORAL EVERY 12 HOURS
Status: DISPENSED | OUTPATIENT
Start: 2020-09-29 | End: 2020-10-01

## 2020-09-29 RX ADMIN — IPRATROPIUM BROMIDE AND ALBUTEROL SULFATE 3 ML: .5; 3 SOLUTION RESPIRATORY (INHALATION) at 20:06

## 2020-09-29 NOTE — ED PROVIDER NOTES
[facility] EMERGENCY DEPT      5:45 PM    Date: 9/29/2020  Patient Name: Tonnie Lundborg    History of Presenting Illness     Chief Complaint   Patient presents with    Shortness of Breath       75 y.o. female with noted past medical history who presents to the emergency department with shortness of breath since this morning. Patient states she was in her usual state of health this morning when she awoke and had some shortness of breath that is continued to the present. She denies any other associated symptoms including no chest pain arm neck or jaw pain, diaphoresis palpitations nausea or vomiting. In addition the patient has not had any URI symptoms to include no cough or sinus congestion or fever. The patient denies recent travel outside [place] and denies recent travel to areas large social gatherings. The patient denies any known history of Covid 19 exposure. Patient denies any other associated signs or symptoms. Patient denies any other complaints. Nursing notes regarding the HPI and triage nursing notes were reviewed. Prior medical records were reviewed. Current Facility-Administered Medications   Medication Dose Route Frequency Provider Last Rate Last Dose    albuterol-ipratropium (DUO-NEB) 2.5 MG-0.5 MG/3 ML  3 mL Nebulization Liliya Seo MD         Current Outpatient Medications   Medication Sig Dispense Refill    montelukast (SINGULAIR) 10 mg tablet       hydrALAZINE (APRESOLINE) 50 mg tablet       omeprazole (PRILOSEC) 20 mg capsule Take 20 mg by mouth daily.  ibuprofen (ADVIL) 200 mg tablet 200 mg.      multivitamin (ONE A DAY) tablet Take by Mouth Once a Day.  traMADol (ULTRAM) 50 mg tablet       diltiazem CD (CARDIZEM CD) 240 mg ER capsule Take  by mouth daily.  MULTIVITS-MIN/FA/CA CARB/VIT K (ONE-A-DAY WOMEN'S 50+ PO) Take  by mouth.  aspirin 81 mg tablet Take 81 mg by mouth daily.          Past History     Past Medical History:  Past Medical History:   Diagnosis Date    Bell palsy     Cancer ([facility] [address]     abdominal    Cells and casts in urine     Hypertension     Ill-defined condition     suprapubic catheter    Kidney stones     Menopause     Microscopic hematuria     Mitral valve insufficiency     Mitral valvular insufficiency     Neurogenic bladder     Recurrent UTI     Ureteral stricture     Urinary retention        Past Surgical History:  Past Surgical History:   Procedure Laterality Date    ABDOMEN SURGERY PROC UNLISTED      Wide local excison of abdominal wall mass 7/5/2016    COLONOSCOPY N/A 7/28/2017    COL[facility]COPY performed [facility] at [facility]OSCOPY    HX APPENDECTOMY      HX COLONOSCOPY      HX COLOSTOMY  2008    HX HYSTERECTOMY  1980's    fibroids    HX UROLOGICAL  2012    suprapubic tube placement       Family History:  Family History   Problem Relation Age of Onset    Hypertension Father     Other Brother         falling accident       Social History:  Social History     Tobacco Use    Smoking status: Never Smoker    Smokeless tobacco: Never Used   Substance Use Topics    Alcohol use: No    Drug use: No       Allergies: Allergies   Allergen Reactions    Losartan Cough    Ciprofloxacin Unknown (comments) and Other (comments)    Penicillins Unknown (comments) and Other (comments)       Patient's primary care provider (as noted in EPIC):  Bailey Salter MD    Review of Systems   Constitutional: Negative for diaphoresis. HENT: Negative for congestion. Eyes: Negative for discharge. Respiratory: Negative for wheezing and stridor. Cardiovascular: Negative for chest pain, palpitations and leg swelling. Gastrointestinal: Negative for diarrhea. Genitourinary: Negative for flank pain. Musculoskeletal: Negative for back pain. Neurological: Negative for weakness. Psychiatric/Behavioral: Negative for hallucinations. All other systems reviewed and are negative.       Visit Vitals  /85   Pulse (!) 102   Temp 100.3 °F (37.9 °C)   Resp 28   SpO2 99%       PHYSICAL EXAM:    CONSTITUTIONAL:  Alert, in no apparent distress;  well developed;  well nourished. HEAD:  Normocephalic, atraumatic. EYES:  EOMI. Non-icteric sclera. Normal conjunctiva. ENTM:  Nose:  no rhinorrhea. Throat:  no erythema or exudate, mucous membranes moist.  NECK:  No JVD. Supple    RESPIRATORY:  Chest clear, equal breath sounds, good air movement. CARDIOVASCULAR:  Regular rate and rhythm. No murmurs, rubs, or gallops. GI:  Normal bowel sounds, abdomen soft and non-tender. No rebound or guarding. BACK:  Non-tender. UPPER EXT:  Normal inspection. LOWER EXT:  No edema, no calf tenderness. Distal pulses intact. NEURO:  Moves all four extremities, and grossly normal motor exam.  SKIN:  No rashes;  Normal for age. PSYCH:  Alert and normal affect. DIFFERENTIAL DIAGNOSES/ MEDICAL DECISION MAKING:   Shortness of breath etiologies include chronic obstructive pulmonary disease (COPD), acute asthma exacerbation, congestive heart failure, pneumonia, acute bronchitis, pulmonary embolism, upper respiratory infection, cardiac event to include acute coronary syndrome, acute myocardial infarction or a combination of the above (ex URI on top of COPD thus causing respiratory distress).       Diagnostic Study Results     Abnormal lab results from this emergency department encounter:  Labs Reviewed   CBC WITH AUTOMATED DIFF - Abnormal; Notable for the following components:       Result Value    RBC 2.61 (*)     HGB 6.9 (*)     HCT 21.9 (*)     RDW 15.7 (*)     PLATELET 381 (*)     NEUTROPHILS 76 (*)     LYMPHOCYTES 18 (*)     All other components within normal limits   METABOLIC PANEL, BASIC - Abnormal; Notable for the following components:    BUN 50 (*)     Creatinine 3.39 (*)     GFR est AA 16 (*)     GFR est non-AA 13 (*)     All other components within normal limits   D DIMER - Abnormal; Notable for the following components:    D DIMER 7.58 (*)     All other components within normal limits   CARDIAC PANEL,(CK, CKMB & TROPONIN)   MAGNESIUM   NOVEL CORONAVIRUS (COVID-19)   POC LACTIC ACID       Lab values for this patient within approximately the last 12 hours:  Recent Results (from the past 12 hour(s))   CBC WITH AUTOMATED DIFF    Collection Time: 09/29/20  5:55 PM   Result Value Ref Range    WBC 10.0 4.6 - 13.2 K/uL    RBC 2.61 (L) 4.20 - 5.30 M/uL    HGB 6.9 (L) 12.0 - 16.0 g/dL    HCT 21.9 (L) 35.0 - 45.0 %    MCV 83.9 74.0 - 97.0 FL    MCH 26.4 24.0 - 34.0 PG    MCHC 31.5 31.0 - 37.0 g/dL    RDW 15.7 (H) 11.6 - 14.5 %    PLATELET 668 (H) 122 - 420 K/uL    MPV 9.3 9.2 - 11.8 FL    NEUTROPHILS 76 (H) 40 - 73 %    LYMPHOCYTES 18 (L) 21 - 52 %    MONOCYTES 6 3 - 10 %    EOSINOPHILS 0 0 - 5 %    BASOPHILS 0 0 - 2 %    ABS. NEUTROPHILS 7.6 1.8 - 8.0 K/UL    ABS. LYMPHOCYTES 1.8 0.9 - 3.6 K/UL    ABS. MONOCYTES 0.6 0.05 - 1.2 K/UL    ABS. EOSINOPHILS 0.0 0.0 - 0.4 K/UL    ABS.  BASOPHILS 0.0 0.0 - 0.1 K/UL    DF AUTOMATED     METABOLIC PANEL, BASIC    Collection Time: 09/29/20  5:55 PM   Result Value Ref Range    Sodium 136 136 - 145 mmol/L    Potassium 3.5 3.5 - 5.5 mmol/L    Chloride 103 100 - 111 mmol/L    CO2 25 21 - 32 mmol/L    Anion gap 8 3.0 - 18 mmol/L    Glucose 92 74 - 99 mg/dL    BUN 50 (H) 7.0 - 18 MG/DL    Creatinine 3.39 (H) 0.6 - 1.3 MG/DL    BUN/Creatinine ratio 15 12 - 20      GFR est AA 16 (L) >60 ml/min/1.73m2    GFR est non-AA 13 (L) >60 ml/min/1.73m2    Calcium 8.5 8.5 - 10.1 MG/DL   CARDIAC PANEL,(CK, CKMB & TROPONIN)    Collection Time: 09/29/20  5:55 PM   Result Value Ref Range    CK - MB <1.0 <3.6 ng/ml    CK-MB Index  0.0 - 4.0 %     CALCULATION NOT PERFORMED WHEN RESULT IS BELOW LINEAR LIMIT    CK 53 26 - 192 U/L    Troponin-I, QT <0.02 0.0 - 0.045 NG/ML   MAGNESIUM    Collection Time: 09/29/20  5:55 PM   Result Value Ref Range    Magnesium 1.6 1.6 - 2.6 mg/dL   D DIMER    Collection Time: 09/29/20  5:55 PM   Result Value Ref Range    D DIMER 7.58 (H) <0.46 ug/ml(FEU)   POC LACTIC ACID    Collection Time: 09/29/20  5:58 PM   Result Value Ref Range    Lactic Acid (POC) 1.21 0.40 - 2.00 mmol/L       Radiologist and cardiologist interpretations if available at time of this note:  No results found. Portable (A-P view) CXR:  Preliminary review of x-rays by ED Physician. Interpretation of chest X-ray shows, no infiltrates, no pneumothorax, no CHF, no effusion. Medication(s) ordered for patient during this emergency visit encounter:  Medications   albuterol-ipratropium (DUO-NEB) 2.5 MG-0.5 MG/3 ML (has no administration in time range)       Medical Decision Making     I am the first provider for this patient. I reviewed the vital signs, available nursing notes, past medical history, past surgical history, family history and social history. Vital Signs:  Reviewed the patient's vital signs. ED COURSE:        Patient's PERC screening cannot be assessed secondary to age and tachycardia. Admit to Hospitalist    The patient was presented to the accepting hospitalist, Dr. Bishop Arguello. The patient's primary doctor is Carmen Cardenas MD, and admissions for this physician are with the hospitalist.  If the patient has no primary doctor, then admission is to the hospitalist as well. As the emergency physician, I wrote courtesy admission orders for the hospitalist physician. The courtesy orders included explicit instructions for the floor nursing staff to call the admitting attending physician upon patient arrival on the floor. Consult Nephrology    The on call nephrologist was called and the patient was presented for nephrology consult. I personally spoke with Dr. Kenneth Amaro, in the nephrology group, about the patient's presentation and management.      I subsequently placed the noted nephrologist on the treatment team.      Dictation disclaimer:  Please note that this dictation was completed with Dragon, the computer voice recognition software. Quite often unanticipated grammatical, syntax, homophones, and other interpretive errors are inadvertently transcribed by the computer software. Please disregard these errors. Please excuse any errors that have escaped final proofreading. Coding Diagnoses     Clinical Impression:   1. Acute renal failure, unspecified acute renal failure type ([address]    2. SOB (shortness of breath)        Disposition     Disposition: Discharge. MONSTER Peralta Board Certified Emergency Physician    Provider Attestation:  If a scribe was utilized in generation of this patient record, I personally performed the services described in the documentation, reviewed the documentation, as recorded by the scribe in my presence, and it accurately records the patient's history of presenting illness, review of systems, patient physical examination, and procedures performed by me as the attending physician. MONSTER Peralta Board Certified Emergency Physician  9/29/2020.  5:46 PM

## 2020-09-29 NOTE — ED TRIAGE NOTES
Patient co shortness of breath x 1 week and got significantly worse this morning. Denies any fever but temp 100.3 upon arrival. Hx CHF. Suprapubic catheter in place. aaox4.

## 2020-09-29 NOTE — ED NOTES
Pt forgetful, asks same questions repeatedly but a/o x 4. Keeps stating she is ready to go home. Reinforced plan of care. Pt voiced understanding.

## 2020-09-30 PROBLEM — R76.0 ABNORMAL ANTIBODY TITER: Status: ACTIVE | Noted: 2020-09-30

## 2020-09-30 PROBLEM — N18.9 CKD (CHRONIC KIDNEY DISEASE): Status: ACTIVE | Noted: 2020-09-30

## 2020-09-30 LAB
ALBUMIN SERPL-MCNC: 2.2 G/DL (ref 3.4–5)
ALBUMIN/GLOB SERPL: 0.5 {RATIO} (ref 0.8–1.7)
ALP SERPL-CCNC: 65 U/L (ref 45–117)
ALT SERPL-CCNC: 11 U/L (ref 13–56)
ANION GAP SERPL CALC-SCNC: 8 MMOL/L (ref 3–18)
AST SERPL-CCNC: 15 U/L (ref 10–38)
ATRIAL RATE: 106 BPM
BASOPHILS # BLD: 0 K/UL (ref 0–0.1)
BASOPHILS NFR BLD: 0 % (ref 0–2)
BILIRUB SERPL-MCNC: 0.6 MG/DL (ref 0.2–1)
BUN SERPL-MCNC: 47 MG/DL (ref 7–18)
BUN/CREAT SERPL: 16 (ref 12–20)
CALCIUM SERPL-MCNC: 7.6 MG/DL (ref 8.5–10.1)
CALCULATED P AXIS, ECG09: 59 DEGREES
CALCULATED R AXIS, ECG10: 60 DEGREES
CALCULATED T AXIS, ECG11: 54 DEGREES
CHLORIDE SERPL-SCNC: 106 MMOL/L (ref 100–111)
CHOLEST SERPL-MCNC: 111 MG/DL
CO2 SERPL-SCNC: 25 MMOL/L (ref 21–32)
CREAT SERPL-MCNC: 2.89 MG/DL (ref 0.6–1.3)
CREAT UR-MCNC: 90 MG/DL (ref 30–125)
DIAGNOSIS, 93000: NORMAL
DIFFERENTIAL METHOD BLD: ABNORMAL
EOSINOPHIL # BLD: 0 K/UL (ref 0–0.4)
EOSINOPHIL NFR BLD: 0 % (ref 0–5)
ERYTHROCYTE [DISTWIDTH] IN BLOOD BY AUTOMATED COUNT: 15.6 % (ref 11.6–14.5)
GLOBULIN SER CALC-MCNC: 4.2 G/DL (ref 2–4)
GLUCOSE SERPL-MCNC: 95 MG/DL (ref 74–99)
HBA1C MFR BLD: <3.8 % (ref 4.2–5.6)
HCT VFR BLD AUTO: 18.2 % (ref 35–45)
HCT VFR BLD AUTO: 22.4 % (ref 35–45)
HDLC SERPL-MCNC: 34 MG/DL (ref 40–60)
HDLC SERPL: 3.3 {RATIO} (ref 0–5)
HGB BLD-MCNC: 5.8 G/DL (ref 12–16)
HGB BLD-MCNC: 7.2 G/DL (ref 12–16)
HISTORY CHECKED?,CKHIST: NORMAL
LDLC SERPL CALC-MCNC: 59.4 MG/DL (ref 0–100)
LIPID PROFILE,FLP: ABNORMAL
LYMPHOCYTES # BLD: 1.3 K/UL (ref 0.9–3.6)
LYMPHOCYTES NFR BLD: 15 % (ref 21–52)
MCH RBC QN AUTO: 26.4 PG (ref 24–34)
MCHC RBC AUTO-ENTMCNC: 31.9 G/DL (ref 31–37)
MCV RBC AUTO: 82.7 FL (ref 74–97)
MONOCYTES # BLD: 0.5 K/UL (ref 0.05–1.2)
MONOCYTES NFR BLD: 5 % (ref 3–10)
NEUTS SEG # BLD: 7 K/UL (ref 1.8–8)
NEUTS SEG NFR BLD: 80 % (ref 40–73)
P-R INTERVAL, ECG05: 164 MS
PLATELET # BLD AUTO: 395 K/UL (ref 135–420)
PMV BLD AUTO: 9 FL (ref 9.2–11.8)
POTASSIUM SERPL-SCNC: 3.2 MMOL/L (ref 3.5–5.5)
POTASSIUM SERPL-SCNC: 3.4 MMOL/L (ref 3.5–5.5)
PROT SERPL-MCNC: 6.4 G/DL (ref 6.4–8.2)
Q-T INTERVAL, ECG07: 344 MS
QRS DURATION, ECG06: 74 MS
QTC CALCULATION (BEZET), ECG08: 456 MS
RBC # BLD AUTO: 2.2 M/UL (ref 4.2–5.3)
SODIUM SERPL-SCNC: 139 MMOL/L (ref 136–145)
SODIUM UR-SCNC: 41 MMOL/L (ref 20–110)
TRIGL SERPL-MCNC: 88 MG/DL (ref ?–150)
TSH SERPL DL<=0.05 MIU/L-ACNC: 2.37 UIU/ML (ref 0.36–3.74)
UUN UR-MCNC: <1 MG/DL
VENTRICULAR RATE, ECG03: 106 BPM
VLDLC SERPL CALC-MCNC: 17.6 MG/DL
WBC # BLD AUTO: 8.8 K/UL (ref 4.6–13.2)

## 2020-09-30 PROCEDURE — 74011250636 HC RX REV CODE- 250/636: Performed by: INTERNAL MEDICINE

## 2020-09-30 PROCEDURE — 86922 COMPATIBILITY TEST ANTIGLOB: CPT

## 2020-09-30 PROCEDURE — 87086 URINE CULTURE/COLONY COUNT: CPT

## 2020-09-30 PROCEDURE — 86902 BLOOD TYPE ANTIGEN DONOR EA: CPT

## 2020-09-30 PROCEDURE — 82570 ASSAY OF URINE CREATININE: CPT

## 2020-09-30 PROCEDURE — 84443 ASSAY THYROID STIM HORMONE: CPT

## 2020-09-30 PROCEDURE — 87635 SARS-COV-2 COVID-19 AMP PRB: CPT

## 2020-09-30 PROCEDURE — 84540 ASSAY OF URINE/UREA-N: CPT

## 2020-09-30 PROCEDURE — 85025 COMPLETE CBC W/AUTO DIFF WBC: CPT

## 2020-09-30 PROCEDURE — 84132 ASSAY OF SERUM POTASSIUM: CPT

## 2020-09-30 PROCEDURE — 83036 HEMOGLOBIN GLYCOSYLATED A1C: CPT

## 2020-09-30 PROCEDURE — 65660000000 HC RM CCU STEPDOWN

## 2020-09-30 PROCEDURE — 30233N1 TRANSFUSION OF NONAUTOLOGOUS RED BLOOD CELLS INTO PERIPHERAL VEIN, PERCUTANEOUS APPROACH: ICD-10-PCS | Performed by: INTERNAL MEDICINE

## 2020-09-30 PROCEDURE — 85018 HEMOGLOBIN: CPT

## 2020-09-30 PROCEDURE — 74011250637 HC RX REV CODE- 250/637: Performed by: HOSPITALIST

## 2020-09-30 PROCEDURE — 80061 LIPID PANEL: CPT

## 2020-09-30 PROCEDURE — 36430 TRANSFUSION BLD/BLD COMPNT: CPT

## 2020-09-30 PROCEDURE — 84300 ASSAY OF URINE SODIUM: CPT

## 2020-09-30 PROCEDURE — 74011250637 HC RX REV CODE- 250/637: Performed by: INTERNAL MEDICINE

## 2020-09-30 PROCEDURE — 94762 N-INVAS EAR/PLS OXIMTRY CONT: CPT

## 2020-09-30 PROCEDURE — P9016 RBC LEUKOCYTES REDUCED: HCPCS

## 2020-09-30 PROCEDURE — 74011000258 HC RX REV CODE- 258: Performed by: INTERNAL MEDICINE

## 2020-09-30 PROCEDURE — 86921 COMPATIBILITY TEST INCUBATE: CPT

## 2020-09-30 RX ORDER — SODIUM CHLORIDE 0.9 % (FLUSH) 0.9 %
5-40 SYRINGE (ML) INJECTION EVERY 8 HOURS
Status: DISCONTINUED | OUTPATIENT
Start: 2020-09-30 | End: 2020-10-05 | Stop reason: HOSPADM

## 2020-09-30 RX ORDER — DILTIAZEM HYDROCHLORIDE 240 MG/1
240 CAPSULE, COATED, EXTENDED RELEASE ORAL DAILY
Status: DISCONTINUED | OUTPATIENT
Start: 2020-09-30 | End: 2020-10-02

## 2020-09-30 RX ORDER — LANOLIN ALCOHOL/MO/W.PET/CERES
12 CREAM (GRAM) TOPICAL
Status: DISCONTINUED | OUTPATIENT
Start: 2020-09-30 | End: 2020-10-05 | Stop reason: HOSPADM

## 2020-09-30 RX ORDER — GUAIFENESIN 100 MG/5ML
81 LIQUID (ML) ORAL DAILY
Status: DISCONTINUED | OUTPATIENT
Start: 2020-09-30 | End: 2020-10-05 | Stop reason: HOSPADM

## 2020-09-30 RX ORDER — IPRATROPIUM BROMIDE AND ALBUTEROL SULFATE 2.5; .5 MG/3ML; MG/3ML
3 SOLUTION RESPIRATORY (INHALATION)
Status: DISCONTINUED | OUTPATIENT
Start: 2020-09-30 | End: 2020-10-05 | Stop reason: HOSPADM

## 2020-09-30 RX ORDER — ONDANSETRON 2 MG/ML
4 INJECTION INTRAMUSCULAR; INTRAVENOUS
Status: DISCONTINUED | OUTPATIENT
Start: 2020-09-30 | End: 2020-10-05 | Stop reason: HOSPADM

## 2020-09-30 RX ORDER — POTASSIUM CHLORIDE 20 MEQ/1
40 TABLET, EXTENDED RELEASE ORAL EVERY 4 HOURS
Status: COMPLETED | OUTPATIENT
Start: 2020-10-01 | End: 2020-10-01

## 2020-09-30 RX ORDER — DOCUSATE SODIUM 100 MG/1
100 CAPSULE, LIQUID FILLED ORAL
Status: DISCONTINUED | OUTPATIENT
Start: 2020-09-30 | End: 2020-10-05 | Stop reason: HOSPADM

## 2020-09-30 RX ORDER — THERA TABS 400 MCG
1 TAB ORAL DAILY
Status: DISCONTINUED | OUTPATIENT
Start: 2020-09-30 | End: 2020-10-05 | Stop reason: HOSPADM

## 2020-09-30 RX ORDER — ACETAMINOPHEN 325 MG/1
650 TABLET ORAL
Status: DISCONTINUED | OUTPATIENT
Start: 2020-09-30 | End: 2020-10-05 | Stop reason: HOSPADM

## 2020-09-30 RX ORDER — PANTOPRAZOLE SODIUM 40 MG/10ML
40 INJECTION, POWDER, LYOPHILIZED, FOR SOLUTION INTRAVENOUS DAILY PRN
Status: DISCONTINUED | OUTPATIENT
Start: 2020-09-30 | End: 2020-09-30 | Stop reason: SDUPTHER

## 2020-09-30 RX ORDER — MONTELUKAST SODIUM 10 MG/1
10 TABLET ORAL
Status: DISCONTINUED | OUTPATIENT
Start: 2020-09-30 | End: 2020-10-05 | Stop reason: HOSPADM

## 2020-09-30 RX ORDER — SODIUM CHLORIDE AND POTASSIUM CHLORIDE .9; .15 G/100ML; G/100ML
SOLUTION INTRAVENOUS CONTINUOUS
Status: DISCONTINUED | OUTPATIENT
Start: 2020-09-30 | End: 2020-10-05

## 2020-09-30 RX ORDER — PANTOPRAZOLE SODIUM 40 MG/1
40 TABLET, DELAYED RELEASE ORAL DAILY PRN
Status: DISCONTINUED | OUTPATIENT
Start: 2020-09-30 | End: 2020-10-05 | Stop reason: HOSPADM

## 2020-09-30 RX ORDER — SODIUM CHLORIDE 0.9 % (FLUSH) 0.9 %
5-40 SYRINGE (ML) INJECTION AS NEEDED
Status: DISCONTINUED | OUTPATIENT
Start: 2020-09-30 | End: 2020-10-05 | Stop reason: HOSPADM

## 2020-09-30 RX ORDER — POTASSIUM CHLORIDE 750 MG/1
10 TABLET, EXTENDED RELEASE ORAL
Status: DISCONTINUED | OUTPATIENT
Start: 2020-09-30 | End: 2020-09-30

## 2020-09-30 RX ORDER — HYDRALAZINE HYDROCHLORIDE 50 MG/1
50 TABLET, FILM COATED ORAL 3 TIMES DAILY
Status: DISCONTINUED | OUTPATIENT
Start: 2020-09-30 | End: 2020-10-05 | Stop reason: HOSPADM

## 2020-09-30 RX ADMIN — Medication 10 ML: at 21:47

## 2020-09-30 RX ADMIN — Medication 10 ML: at 02:00

## 2020-09-30 RX ADMIN — DILTIAZEM HYDROCHLORIDE 240 MG: 240 CAPSULE, COATED, EXTENDED RELEASE ORAL at 10:06

## 2020-09-30 RX ADMIN — GUAIFENESIN 600 MG: 600 TABLET, EXTENDED RELEASE ORAL at 00:47

## 2020-09-30 RX ADMIN — POTASSIUM BICARBONATE 50 MEQ: 391 TABLET, EFFERVESCENT ORAL at 16:11

## 2020-09-30 RX ADMIN — Medication 10 ML: at 05:51

## 2020-09-30 RX ADMIN — CEFTRIAXONE 1 G: 1 INJECTION, POWDER, FOR SOLUTION INTRAMUSCULAR; INTRAVENOUS at 21:46

## 2020-09-30 RX ADMIN — MONTELUKAST 10 MG: 10 TABLET, FILM COATED ORAL at 21:46

## 2020-09-30 RX ADMIN — HYDRALAZINE HYDROCHLORIDE 50 MG: 50 TABLET, FILM COATED ORAL at 21:46

## 2020-09-30 RX ADMIN — GUAIFENESIN 600 MG: 600 TABLET, EXTENDED RELEASE ORAL at 21:46

## 2020-09-30 RX ADMIN — Medication 12 MG: at 01:44

## 2020-09-30 RX ADMIN — HYDRALAZINE HYDROCHLORIDE 50 MG: 50 TABLET, FILM COATED ORAL at 16:12

## 2020-09-30 RX ADMIN — GUAIFENESIN 600 MG: 600 TABLET, EXTENDED RELEASE ORAL at 09:00

## 2020-09-30 RX ADMIN — SODIUM CHLORIDE 125 ML/HR: 900 INJECTION, SOLUTION INTRAVENOUS at 00:49

## 2020-09-30 RX ADMIN — POTASSIUM CHLORIDE AND SODIUM CHLORIDE: 900; 150 INJECTION, SOLUTION INTRAVENOUS at 13:54

## 2020-09-30 RX ADMIN — HYDRALAZINE HYDROCHLORIDE 50 MG: 50 TABLET, FILM COATED ORAL at 09:00

## 2020-09-30 RX ADMIN — THERA TABS 1 TABLET: TAB at 09:00

## 2020-09-30 RX ADMIN — Medication 10 ML: at 14:00

## 2020-09-30 RX ADMIN — ASPIRIN 81 MG CHEWABLE TABLET 81 MG: 81 TABLET CHEWABLE at 09:00

## 2020-09-30 RX ADMIN — AZITHROMYCIN MONOHYDRATE 500 MG: 500 INJECTION, POWDER, LYOPHILIZED, FOR SOLUTION INTRAVENOUS at 11:19

## 2020-09-30 RX ADMIN — CEFTRIAXONE 1 G: 1 INJECTION, POWDER, FOR SOLUTION INTRAMUSCULAR; INTRAVENOUS at 00:49

## 2020-09-30 NOTE — PROGRESS NOTES
0700 - Bedside and Verbal shift change report given to Juan Shahid RN (oncoming nurse) by Robin Brown RN (offgoing nurse). Report included the following information SBAR, Kardex, Intake/Output, MAR and Cardiac Rhythm NSR - Sinus Tach. 0730 - patient received sleeping. VSS, no signs of distress. 0800 - Full assessment performed. 0900 - Dr. Tony Perez at bedside. TERESA Horn 14 to inquire about unit of blood. Unit of blood has yet to be delivered to [facility]. [facility] will notify RN when unit is on it's way. Will continue to monitor. 56 - Dr. Phuong Smith notified of Potassium level and patient's request for PT to help ambulate, due to hip and back pain. Verbal orders received. 1200 - Reassessment complete. See flowsheets for changes to previous. 1898 - Notified by Deuce Mehta at blood [facility] that unit of blood arrived. 1400 - Blood transfusion started. Will monitor for signs of reaction. 1415 - VSS, no signs of distress. Will continue monitoring. 1445 - VSS, no signs of distress. Will continue monitoring. 1700 - Suprapubic catheter fell out. Please see preceptor Bob Carter RN's note for details. 1800 - patient eating in bed. 1830 - Watching t.v. in bed.     1900 - Bedside and Verbal shift change report given to Robin Brown RN (oncoming nurse) by Juan Shahid RN (offgoing nurse). Report included the following information SBAR, Kardex, Intake/Output, MAR, Cardiac Rhythm NSR / Sinus Tach and Alarm Parameters .

## 2020-09-30 NOTE — PROGRESS NOTES
Problem: Infection - Risk of, Urinary Catheter-Associated Urinary Tract Infection  Goal: *Absence of infection signs and symptoms  Outcome: Progressing Towards Goal

## 2020-09-30 NOTE — PROGRESS NOTES
conducted an initial consultation and Spiritual Assessment for James Phan, who is a 75 y.o.,female. Patients Primary Language is: [place]. According to the patients EMR [employer] Affiliation is: Jose Luis Alvarez. The reason the Patient came to the hospital is:   Patient Active Problem List    Diagnosis Date Noted    CKD (chronic kidney disease) 09/30/2020    Abnormal antibody titer 09/30/2020    Stage 3 acute kidney injury ([facility] [address] 09/29/2020    UTI (urinary tract infection) 09/29/2020    Symptomatic anemia 09/29/2020    Elevated d-dimer 09/29/2020    Thrombocytosis ([facility] [address] 09/29/2020    Colitis, nonspecific 07/10/2017    Rectal bleeding 07/10/2017    Stricture, urethra 12/11/2015    Bladder stones 12/11/2015    Neurogenic bladder 09/05/2012    Recurrent UTI 09/05/2012        The  provided the following Interventions:  Initiated a relationship of care and support with patient in room 2703 via a phone conversation as patient is in the ICU in an isolation room due to droplet plus infection. Listened empathically as patient talked about her gladys and her being here. Patient was pleased to have the  make contact with her and for the prayer. Provided information about Spiritual Care Services. Offered prayer and assurance of continued prayers on patients behalf. The following outcomes were achieved:  Patient shared limited information about her medical narrative and spiritual journey/beliefs. Patient processed feeling about current hospitalization. Patient expressed gratitude for pastoral care visit. Assessment:  Patient does not have any Islam/cultural needs that will affect patients preferences in health care. There are no further spiritual or Islam issues which require Spiritual Care Services interventions at this time. Plan:  Chaplains will continue to follow and will provide pastoral care on an as needed/requested basis    . Korina Black [address] Certified [address] [place] [address]   [phone]

## 2020-09-30 NOTE — PROGRESS NOTES
OT orders received and medical chart review completed. Low H & H 5.8/18.2, per nursing pt to receive blood transfusion. Hold OT evaluation until tomorrow s/p transfusion and increased H & H for patient safety. OT to follow.      Lulu [address] L[address] OTR/L  [phone]

## 2020-09-30 NOTE — PROGRESS NOTES
Problem: Infection - Risk of, Urinary Catheter-Associated Urinary Tract Infection  Goal: *Absence of infection signs and symptoms  9/30/2020 0724 by Varsha BARROW  Outcome: Progressing Towards Goal  9/30/2020 0723 by Varsha BARROW  Outcome: Progressing Towards Goal     Problem: Patient Education: Go to Patient Education Activity  Goal: Patient/Family Education  9/30/2020 0724 by Varsha Garcia  Outcome: Progressing Towards Goal  9/30/2020 0723 by Varsha BARROW  Outcome: Progressing Towards Goal     Problem: Falls - Risk of  Goal: *Absence of Falls  Description: Document Edmonia Morning Fall Risk and appropriate interventions in the flowsheet.   Outcome: Progressing Towards Goal  Note: Fall Risk Interventions:

## 2020-09-30 NOTE — PROGRESS NOTES
1346: PT orders received and chart reviewed. Per chart review, H/H 5.8/18.2 and pending blood transfusion. Mobility contraindicated. Will follow up for PT evaluation pending lab values to maximize patient safety and participation. Per chart, overnight ambulating in room without assistance.

## 2020-09-30 NOTE — H&P
History and Physical    Patient: Zeina Awad MRN: [id number]  SSN: [id number]    YOB: 1945  Age: 75 y.o. Sex: female      Subjective:      Zeina Awad is a 75 y.o. -American female who presents to [facility] ER with complaint of Shortness of Breath. Patient reports shortness of breath for 2 weeks, worse today. Patient reports cough for 2 weeks worse with deep breath. Patient reports chest pains intermittently for the last 2 weeks and reports that they feel alternative like weigh and other times like a crushing sensation without radiation. Patient denies diaphoresis, admits to some shortness of breath, and denies nausea. Patient complains of early satiety, poor appetite, and poor PO intake for approximately 3 weeks and reports a 30-lb weight loss in the last 3 weeks. Patient states that she has not been keeping up with her fluids. Patient denies dysuria, urgency, and increased urinary frequency. Patient denied abdominal pain. Patient reports that her suprapubic catheter is changed every 3 weeks and was changed last week. Patient denies melena, hematochezia, and hematuria. Patient denies fevers, vomiting, and sick contacts. In [facility] ER, Patient is noted to have Temperature 100.3°F, Herat Rate 105 bpm, Respiration Rate 28 bpm, Blood Pressure 105/85 mm Hg, WBC 10.0, Hgb 6.9, Platelet 113, Neut% 91%, Neut# 7.6,  D-Dimer 7.58, BUN 50, Creatinine 3.39 (Baseline Creatinine 1.05-1.10), eGFR 13/16, Troponin <0.02, and Lactic Acid 1.21. Patient may have received a single breathing treatment in [facility] ER. Patient is admitted to [facility] Telemetry Unit (Covid-19 Cohort for Rule Out) for management of Complicated UTI (Suprapubic Catheter), Acute Kidney Injury Stage 3, Symptomatic Anemia, and questionable CAP versus Right Pleural Effusion.      Past Medical History:   Diagnosis Date    Abdominal wall mass     (Cancer?) S/P Excision    Baker's cyst of knee, left     Bell palsy     Benign hypertensive CKD, stage 1-4 or unspecified chronic kidney disease     CKD Stage II-IIIA    Cancer ([facility] [address]     abdominal    Cylindrical bronchiectasis (HCC)     Predominantly Lower lobe(s)    Grade I diastolic dysfunction 86/42/5128    By TTE    Hiatal hernia     Small    History of GI bleed 06/16/2017    Hypertension     Kidney stones     Left lower lobe pulmonary nodule     Subpleural    Menopause     Neurogenic bladder     Osteopenia 06/08/2016    By DEXA    Recurrent UTI     Suprapubic catheter (HCC)     2/2 Urinary Retention, Neurogenic Bladder, and Urethral Stricture    Ureteral stricture     Urinary retention      Past Surgical History:   Procedure Laterality Date    ABDOMEN SURGERY PROC UNLISTED      Wide local excison of abdominal wall mass 7/5/2016    COLONOSCOPY N/A 7/28/2017    COLONOSCOPY performed by Anisa Lobo MD at [facility] ENDOSCOPY    HX APPENDECTOMY      HX COLONOSCOPY      HX COLOSTOMY  2008    HX HYSTERECTOMY  1980's    fibroids    HX UROLOGICAL  2012    suprapubic tube placement      Family History   Problem Relation Age of Onset    Hypertension Father     Other Brother         falling accident     Social History     Tobacco Use    Smoking status: Never Smoker    Smokeless tobacco: Never Used   Substance Use Topics    Alcohol use: No      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 Prior to Admission medications    Medication Sig Start Date End Date Taking? Authorizing Provider   montelukast (SINGULAIR) 10 mg tablet  1/23/19   Provider, Historical   hydrALAZINE (APRESOLINE) 50 mg tablet  1/23/19   Provider, Historical   omeprazole (PRILOSEC) 20 mg capsule Take 20 mg by mouth daily. Provider, Historical   ibuprofen (ADVIL) 200 mg tablet 200 mg. Provider, Historical   multivitamin (ONE A DAY) tablet Take by Mouth Once a Day.      Provider, Historical   traMADol (ULTRAM) 50 mg tablet  11/18/16   Provider, Historical   diltiazem CD (CARDIZEM CD) 240 mg ER capsule Take  by mouth daily. Provider, Historical   MULTIVITS-MIN/FA/CA CARB/VIT K (ONE-A-DAY WOMEN'S 50+ PO) Take  by mouth. Provider, Historical   aspirin 81 mg tablet Take 81 mg by mouth daily. Provider, Historical        Allergies   Allergen Reactions    Losartan Cough    Ciprofloxacin Unknown (comments) and Other (comments)    Penicillins Other (comments) and Unknown (comments)     Patient has tolerated Ceftriaxone in ED on 12/25/2013. Review of Systems:  (+) Weight Change  (+) Loss of Appetite  (+) Early Satiety  (+) Chills  (+) Fatigue  (+) Generalized Weakness  (+) Cough  (+) Pain with Inspiration  (+) Shortness of Breath  (+) Dyspnea on Exertion  (+) Chest Pain  (+) Nausea  (-) Fevers  (-) Increased Sputum Production  (-) Wheezing  (-) Abdominal Pain  (-) Vomiting  (-) Diarrhea  (-) Hematochezia/Melena  (-) Increased Urinary Frequency  (-) Urinary Urgency  (-) Polyuria  (-) Hematuria  (-) Dysuria  (-) Diaphoresis  All other systems have been reviewed and are negative      Objective:     Vitals:    09/30/20 0020 09/30/20 0030 09/30/20 0040 09/30/20 0050   BP:  (!) 123/56 (!) 132/49 135/69   Pulse: (!) 154 (!) 121 (!) 114 (!) 130   Resp: (!) 36 22 23 21   Temp:       SpO2: 98% 98% 98% 99%        Physical Exam:  General:  Elderly Adult -American female lying in bed in no acute distress  HEENT:  Atraumatic, normocephalic; Pupils equally round and reactive to light with accommodation; Extraocular muscles intact; (+) Glasses in place; Moist Oropharynx without erythema, edema, or exudates; (+) Questionable Sublingual Jaundice; (+) Procedure Mask in place  Neck:  No Bruits; No Lymphadenopathy  Chest:  No pectus carinatum;  No pectus excavatum  Cardiovascular:  Regular rate and rhythm without rubs, gallops, or murmurs  Respiratory:  (+) Slight Crackles over Right Lung base; otherwise, Clear to Auscultation Bilaterally without wheezes or rhonchi; normal effort of breathing  Abdominal:  Soft, non-tense, (+) Exquisitely tender abdomen; BS present with guarding without rebound or masses  :  (+) Suprapublic Catheter  Extremities:  Pulses 2+ x4 without edema, clubbing, or cyanosis; (+) ModeratelyTender BLE on lateral aspects of tje-bj-fzoorw tibias bilaterally  Musculoskeletal:  Strength 5/5 and symmetrical in BUE; (+) Strength 4-5/5 in BLE  Integument:  No rash on face, forearms, or legs  Neurological:  A&O x4/4; (+) Deficits of Left-sided Facial Expression; (+) Mild Deficit of Phonation/Speech; No gross deficits of Visual Acuity, Eye Movement, Jaw Opening, Hearing, or Head Movement; No gross deficits of Tongue Movement  Psychiatric:  (+) Affect is Panicked at times, otherwise appropriate and full; Language is present and fluent, but (+) Patient drops words (typically articles), simplifies sentences (possibly for expediency, simplicity, or English as a second language); Behavior is (+) Emotionally Distressed at times with some perseveration on Ruchi, but otherwise appropriate      I have personally reviewed the CXR and have found, per my read, elevated right chasidy-diaphragm with increased opacification of Right Lung with questionable pneumonia versus Large Right Pleural Effusion. Await final read. I have personally reviewed the CXR and have found, per my read, tachycardia (106 bpm) and artifact in lead aVL. Assessment:     Hospital Problems  Date Reviewed: 9/29/2020          Codes Class Noted POA    * (Principal) UTI (urinary tract infection) ICD-10-CM: N39.0  ICD-9-CM: 599.0  9/29/2020 Yes    Overview Signed 9/29/2020 10:06 PM by Moon Rosas,      Patient has a Chronic Suprapubic Catheter at home. Stage 3 acute kidney injury [facility]) ICD-10-CM: N17.9  ICD-9-CM: 584.9  9/29/2020 Yes    Overview Signed 9/29/2020 10:07 PM by Moon Rosas,      Possibly due to UTI, possibly due to reduced PO intake. Unclear upon admission (9/29/2020). Symptomatic anemia ICD-10-CM: D64.9  ICD-9-CM: 285.9  9/29/2020 Yes    Overview Signed 9/29/2020 10:06 PM by Yesica Lane, DO     Consider Iron Deficiency Anemia. Elevated d-dimer ICD-10-CM: R79.89  ICD-9-CM: 790.92  9/29/2020 Yes        Thrombocytosis ([address] ICD-10-CM: D47.3  ICD-9-CM: 238.71  9/29/2020 Yes    Overview Signed 9/29/2020 10:05 PM by Yesica Lane, DO     Consider Iron Deficiency Anemia. CKD (chronic kidney disease) ICD-10-CM: N18.9  ICD-9-CM: 585.9  9/30/2020 Yes    Overview Signed 9/30/2020  1:40 AM by Yesica Lane, DO     Stage II-IIIA? Stricture, urethra ICD-10-CM: N35.919  ICD-9-CM: 598.9  12/11/2015 Yes        Bladder stones ICD-10-CM: N21.0  ICD-9-CM: 594.1  12/11/2015 Yes        Neurogenic bladder ICD-10-CM: N31.9  ICD-9-CM: 596.54  9/5/2012 Yes        Recurrent UTI ICD-10-CM: N39.0  ICD-9-CM: 599.0  9/5/2012 Yes              Plan:     IV Telemetry, IV Ceftriaxone (Patient tolerate in the past), IV Azithromycin, Guaifenesin, IV fluids, Transfusion of 1 unit of PRBCs with Pre-Treatment with IV Famotidine and PO Acetaminophen, Serial Troponin, Sputum Culture, Blood Culture, Urine Culture, and check TSH, Lipid Pane, and HbA1c. Check Urine Sodium, Urine Creatinine, and Urine Urea in AM.  Check Fecal Occult Stool. Will Transfuse PRBCs with Target Hgb >=7. Await final read of CXR and discontinue Azithromycin if CXR is not read as pneumonia or concern for COVID-19 Infection. Continue home medications for HTN and Seasonal Allergies. HOLD Ibuprofen. DVT mechanoprophylaxis.     Signed By: Niharika Coffman DO     September 30, 2020

## 2020-09-30 NOTE — PROGRESS NOTES
Problem: Discharge Planning  Goal: *Discharge to safe environment  Outcome: Progressing Towards Goal   Plan home    Reason for Admission:   Anemia, uti                   RUR Score:    12%                 Plan for utilizing home health:     no     PCP: First and Last name:  tray adamson   Name of Practice:    Are you a current patient: Yes/No: yes   Approximate date of last visit: was supposed to have virtual visit yesterday, but came to hosp   Can you participate in a virtual visit with your PCP: yes                    Current Advanced Directive/Advance Care Plan: none                         Transition of Care Plan: spoke with pt,lives alone. 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 She is independent with adls, amb. Has a walker she does not use. No other dme. Has never used snf,hh benefits. demographics correct. Wants niece eli to be first contact for dcp. Family will transport home. Patient has designated ___nieces_____________________ to participate in his/her discharge plan and to receive any needed information. Name: 1) eli sylvester/ 2) jake bazan  Address:  Phone number: 1) C# [phone]                    Plan home, cm to follow for hh needs    Care Management Interventions  PCP Verified by CM: Yes  Palliative Care Criteria Met (RRAT>21 & CHF Dx)?: No  Mode of Transport at Discharge:  Other (see comment)  Discharge Durable Medical Equipment: No  Physical Therapy Consult: No  Occupational Therapy Consult: No  Speech Therapy Consult: No  Current Support Network: Lives Alone  Confirm Follow Up Transport: Family  The Patient and/or Patient Representative was Provided with a Choice of Provider and Agrees with the Discharge Plan?: No  Freedom of Choice List was Provided with Basic Dialogue that Supports the Patient's Individualized Plan of Care/Goals, Treatment Preferences and Shares the Quality Data Associated with the Providers?: No  Discharge Location  Discharge Placement: Home

## 2020-09-30 NOTE — PROGRESS NOTES
Problem: Infection - Risk of, Urinary Catheter-Associated Urinary Tract Infection  Goal: *Absence of infection signs and symptoms  Outcome: Progressing Towards Goal     Problem: Patient Education: Go to Patient Education Activity  Goal: Patient/Family Education  Outcome: Progressing Towards Goal     Problem: Falls - Risk of  Goal: *Absence of Falls  Description: Document Alana Fall Risk and appropriate interventions in the flowsheet. Outcome: Progressing Towards Goal  Note: Fall Risk Interventions:            Medication Interventions: Bed/chair exit alarm, Patient to call before getting OOB, Teach patient to arise slowly                   Problem: Pressure Injury - Risk of  Goal: *Prevention of pressure injury  Description: Document Wily Scale and appropriate interventions in the flowsheet. Outcome: Progressing Towards Goal  Note: Pressure Injury Interventions: Activity Interventions: PT/OT evaluation, Assess need for specialty bed    Mobility Interventions: HOB 30 degrees or less, Turn and reposition approx. every two hours(pillow and wedges)    Nutrition Interventions: Document food/fluid/supplement intake, Offer support with meals,snacks and hydration    Friction and Shear Interventions: Minimize layers, HOB 30 degrees or less                Problem: Patient Education: Go to Patient Education Activity  Goal: Patient/Family Education  Outcome: Progressing Towards Goal     Problem: Falls - Risk of  Goal: *Absence of Falls  Description: Document Alana Fall Risk and appropriate interventions in the flowsheet.   Outcome: Progressing Towards Goal  Note: Fall Risk Interventions:            Medication Interventions: Bed/chair exit alarm, Patient to call before getting OOB, Teach patient to arise slowly                   Problem: Patient Education: Go to Patient Education Activity  Goal: Patient/Family Education  Outcome: Progressing Towards Goal     Problem: Patient Education: Go to Patient Education Activity  Goal: Patient/Family Education  Outcome: Progressing Towards Goal     Problem: Nutrition Deficit  Goal: *Optimize nutritional status  Outcome: Progressing Towards Goal     Problem: Patient Education: Go to Patient Education Activity  Goal: Patient/Family Education  Outcome: Progressing Towards Goal     Problem: Discharge Planning  Goal: *Discharge to safe environment  Outcome: Progressing Towards Goal

## 2020-09-30 NOTE — PROGRESS NOTES
0100 PT arrived on unit. Ambulates without assist to bed. Assessment completed in flowsheets. 0145 Requesting medication to help sleep. Melatonin given. 0400 Assessment. No changes. Bedside shift change report given to [address] [place] Browder (oncoming nurse) by Norah Monroe (offgoing nurse). Report included the following information SBAR, Kardex, Intake/Output, MAR, Recent Results, Med Rec Status, Cardiac Rhythm NSR and Alarm Parameters .

## 2020-09-30 NOTE — ED NOTES
TRANSFER - ED to INPATIENT REPORT:    Verbal report given to Rosales Del Rosario RN (name) on Irma Chatterjee  being transferred to [id number] (unit) for routine progression of care       Report consisted of patients Situation, Background, Assessment and   Recommendations(SBAR). SBAR report made available to receiving floor on this patient being transferred to  [phone] /[phone])  for routine progression of care       Admitting diagnosis Stage 3 acute kidney injury ([facility] [address] [N17.9]    Information from the following report(s) SBAR, Kardex, ED Summary, OR Summary, MAR and Recent Results was made available to receiving floor. Lines:   Peripheral IV 09/29/20 Left Antecubital (Active)   Site Assessment Clean, dry, & intact 09/29/20 1755   Phlebitis Assessment 0 09/29/20 1755   Infiltration Assessment 0 09/29/20 1755   Dressing Status Clean, dry, & intact 09/29/20 1755   Dressing Type Transparent 09/29/20 1755   Hub Color/Line Status Pink;Flushed;Patent 09/29/20 1755   Action Taken Blood drawn 09/29/20 1755   Alcohol Cap Used No 09/29/20 1755        HCG Status for ALL Females under 53 y/o: YES     Medication list updated today    Opportunity for questions and clarification was provided. Patient is oriented to time, place, person and situation.    Patient is  continent and ambulatory without assist     Valuables transported with patient     Patient transported with:   Registered Nurse    MAP (Monitor): 72 =Monitored (most recent)  Vitals w/ MEWS Score (last day)     Date/Time MEWS Score Pulse Resp Temp BP Level of Consciousness SpO2    09/30/20 0010    (!) 111  23    (!) 124/54    97 %    09/30/20 0001    (!) 118  20    (!) 135/51    100 %    09/30/20 0000    (!) 103  22        98 %    09/29/20 2345    97  11        96 %    09/29/20 2330    (!) 107  11    108/72    97 %    09/29/20 2315    97  23        99 %    09/29/20 2300          (!) 119/51        09/29/20 2100    (!) 106  26    112/64    100 % 09/29/20 2045    (!) 109  26    (!) 133/53    100 %    09/29/20 2030    (!) 104  29    126/60    100 %    09/29/20 2015    (!) 104  23    (!) 131/58    100 %    09/29/20 2000    (!) 106  24    128/62    99 %    09/29/20 1945    (!) 114  22    132/62    98 %    09/29/20 1900    (!) 102  28    105/85    99 %    09/29/20 1850    99  22        96 %    09/29/20 1845    (!) 102  19    (!) 120/47    97 %    09/29/20 1742  3  (!) 105  24  100.3 °F (37.9 °C)  127/76  Alert  100 %              Septic Patients:     Lactic Acid  Lab Results   Component Value Date    [facility] 1.21 09/29/2020

## 2020-09-30 NOTE — PROGRESS NOTES
INTERIM UPDATE - [id number] EST on 9/30/2020    Nursing Staff calls to report that H&H returns 5.8. Patient's Type & Screen showed that Patient has formed Abnormal Antibodies, which has required additional compatibility testing at [facility] before an appropriate match for transfusion can be determined. Patient's initial Hgb was 6.9 in [facility] ER; however, the administration of IV fluids to this Patient may be causing hemedilation (rather than a process of bleeding accounting for the decrease in hemoglobin, which is also possible). Blood Bank reports that [facility] anticipates a unit will be ready by ~10-11 AM today. Additionally, this unit will likely only be a partial match. Plan:  HOLD IV fluids at this time until 1st unit of PRBCs is Transfused. Consider transfusing unmatched O negative blood should emergent transfusion become necessary.

## 2020-09-30 NOTE — CONSULTS
Consult Note    Assessment:   · SOLITARIO. Etio- volume depletion in a setting of resp infection/uti/severe anemia. At this time renal function is improving with volume resuscitation. · Hypokalemia. · HTN, controlled. · Resp infection, covid is being r/o. · Complicated uti in a setting of chronic suprapubic catheter. On abx. · Severe anemia. Etio? No overt bleeding. Plans for transfusion noted. W/u per primary team.     Recommendations:   · Continue gentle ivf. · Replace K.   · Agree with plans for blood transfusion. · Continue current antihtn. Avoid hypotension, it may aggravate ischemic kidney injury. · Avoid NSAID's, IV dye. · Avoid fleets enemas due to concern for acute phosphate nephropathy. · Please dose all medications for approximate creatinine clearance  30-15. Thank you. Consult requested by: Krystian Tony MD    ADMIT DATE: 9/29/2020  CONSULT DATE: September 30, 2020           Late entry, patient was seen at 9:00 am.       Admission diagnosis: UTI (urinary tract infection)   Reason for Nephrology Consultation: SOLITARIO. HPI: Olga Gibbons is a 75 y.o. female [address] with h/o of htn and chronic suprapubic catheter for remote h/o of retention/urethral obstruction who presented to [facility] ED with 2 weeks h/o of worsening dry cough and sob. She also reports poor appetite and worsening fatigue. Patient states she lost about 20 ibs. Denies nsaid use. In the ED she was found to be tachycardic and febrile. Patient was admitted, is being r/o for covid. She was started on ivf and on abx for uti. Patient was found to be severely anemic and is awaiting blood transfusion. No prior h/o of kidney disease, scr was 1.06 in may. On admission scr was 3.39, with ivf it is down to 2.89 today.         Past Medical History:   Diagnosis Date    Abdominal wall mass     (Cancer?) S/P Excision    Baker's cyst of knee, left     Bell palsy     Benign hypertensive CKD, stage 1-4 or unspecified chronic kidney disease     CKD Stage II-IIIA    Cancer ([facility] [address]     abdominal    Cylindrical bronchiectasis (HCC)     Predominantly Lower lobe(s)    Grade I diastolic dysfunction 74/96/9102    By TTE    Hiatal hernia     Small    History of GI bleed 06/16/2017    Hypertension     Kidney stones     Left lower lobe pulmonary nodule     Subpleural    Menopause     Neurogenic bladder     Osteopenia 06/08/2016    By DEXA    Recurrent UTI     Suprapubic catheter (HCC)     2/2 Urinary Retention, Neurogenic Bladder, and Urethral Stricture    Ureteral stricture     Urinary retention       Past Surgical History:   Procedure Laterality Date    ABDOMEN SURGERY PROC UNLISTED      Wide local excison of abdominal wall mass 7/5/2016    COLONOSCOPY N/A 7/28/2017    COLONOSCOPY performed by Jamie Paulson MD at [address] [place] [address] ENDOSCOPY    HX APPENDECTOMY      HX COLONOSCOPY      HX COLOSTOMY  2008    HX HYSTERECTOMY  1980's    fibroids    HX UROLOGICAL  2012    suprapubic tube placement       Social History     Socioeconomic History    Marital status: SINGLE     Spouse name: Not on file    Number of children: Not on file    Years of education: Not on file    Highest education level: Not on file   Occupational History    Not on file   Social Needs    Financial resource strain: Not on file    Food insecurity     Worry: Not on file     Inability: Not on file    Transportation needs     Medical: Not on file     Non-medical: Not on file   Tobacco Use    Smoking status: Never Smoker    Smokeless tobacco: Never Used   Substance and Sexual Activity    Alcohol use: No    Drug use: No    Sexual activity: Not on file   Lifestyle    Physical activity     Days per week: Not on file     Minutes per session: Not on file    Stress: Not on file   Relationships    Social connections     Talks on phone: Not on file     Gets together: Not on file     Attends Sabianism service: Not on file     Active member of club or organization: Not on file     Attends meetings of clubs or organizations: Not on file     Relationship status: Not on file    Intimate partner violence     Fear of current or ex partner: Not on file     Emotionally abused: Not on file     Physically abused: Not on file     Forced sexual activity: Not on file   Other Topics Concern     Service Not Asked    Blood Transfusions Not Asked    Caffeine Concern Not Asked    Occupational Exposure Not Asked    Hobby Hazards Not Asked    Sleep Concern Not Asked    Stress Concern Not Asked    Weight Concern Not Asked    Special Diet Not Asked    Back Care Not Asked    Exercise Not Asked    Bike Helmet Not Asked   [address] Not Asked    Self-Exams Not Asked   Social History Narrative    Not on file       Family History   Problem Relation Age of Onset    Hypertension Father     Other Brother         falling accident     Allergies   Allergen Reactions    Losartan Cough    Ciprofloxacin Unknown (comments) and Other (comments)    Penicillins Other (comments) and Unknown (comments)     Patient has tolerated Ceftriaxone in ED on 12/25/2013. Home Medications:     Medications Prior to Admission   Medication Sig    montelukast (SINGULAIR) 10 mg tablet     hydrALAZINE (APRESOLINE) 50 mg tablet     omeprazole (PRILOSEC) 20 mg capsule Take 20 mg by mouth daily.  ibuprofen (ADVIL) 200 mg tablet 200 mg.    multivitamin (ONE A DAY) tablet Take by Mouth Once a Day.  traMADol (ULTRAM) 50 mg tablet     diltiazem CD (CARDIZEM CD) 240 mg ER capsule Take  by mouth daily.  MULTIVITS-MIN/FA/CA CARB/VIT K (ONE-A-DAY WOMEN'S 50+ PO) Take  by mouth.  aspirin 81 mg tablet Take 81 mg by mouth daily.        Current Inpatient Medications:     Current Facility-Administered Medications   Medication Dose Route Frequency    aspirin chewable tablet 81 mg  81 mg Oral DAILY    dilTIAZem ER (CARDIZEM CD) capsule 240 mg  240 mg Oral DAILY    hydrALAZINE (APRESOLINE) tablet 50 mg  50 mg Oral TID    montelukast (SINGULAIR) tablet 10 mg  10 mg Oral QHS    therapeutic multivitamin (THERAGRAN) tablet 1 Tab  1 Tab Oral DAILY    sodium chloride (NS) flush 5-40 mL  5-40 mL IntraVENous Q8H    sodium chloride (NS) flush 5-40 mL  5-40 mL IntraVENous PRN    ondansetron (ZOFRAN) injection 4 mg  4 mg IntraVENous Q6H PRN    acetaminophen (TYLENOL) tablet 650 mg  650 mg Oral Q6H PRN    melatonin tablet 12 mg  12 mg Oral QHS PRN    docusate sodium (COLACE) capsule 100 mg  100 mg Oral BID PRN    albuterol-ipratropium (DUO-NEB) 2.5 MG-0.5 MG/3 ML  3 mL Nebulization Q6H PRN    influenza vaccine 2020-21 (6 mos+)(PF) (FLUARIX/FLULAVAL/FLUZONE QUAD) injection 0.5 mL  0.5 mL IntraMUSCular PRIOR TO DISCHARGE    azithromycin (ZITHROMAX) 500 mg in 0.9% sodium chloride 250 mL IVPB  500 mg IntraVENous Q24H    pantoprazole (PROTONIX) tablet 40 mg  40 mg Oral DAILY PRN    [Held by provider] 0.9% sodium chloride infusion  125 mL/hr IntraVENous CONTINUOUS    0.9% sodium chloride infusion 250 mL  250 mL IntraVENous PRN    famotidine (PF) (PEPCID) 40 mg in 0.9% sodium chloride 10 mL injection  40 mg IntraVENous ONCE PRN    acetaminophen (TYLENOL) tablet 650 mg  650 mg Oral ONCE PRN    cefTRIAXone (ROCEPHIN) 1 g in 0.9% sodium chloride (MBP/ADV) 50 mL MBP  1 g IntraVENous Q24H    guaiFENesin ER (MUCINEX) tablet 600 mg  600 mg Oral Q12H       Review of Systems:   No fever or chills. No sore throat. No hemoptysis. No  chest pain. No orthopnea or paroxysmal nocturnal dyspnea. No nausea, vomiting, abdominal pain, melena or hematochezia. No constipation or diarrhea. Suprapubic catheter works well. No ankle swelling, no joint paints. No muscle aches. No skin changes. No dizziness or lightheadedness. No headaches.        Physical Assessment:     Vitals:    09/30/20 0700 09/30/20 0800 09/30/20 0900 09/30/20 1000   BP: (!) 132/58 138/71 136/69 100/78   Pulse: 90 (!) 101 (!) 110 (!) 101 Resp: 21 20 18 22   Temp:  98.3 °F (36.8 °C)     SpO2: 97% 97% 99% 98%   Weight:  72.1 kg (159 lb)       Last 3 Recorded Weights in this Encounter    09/30/20 0800   Weight: 72.1 kg (159 lb)     Admission weight: Weight: 72.1 kg (159 lb) (09/30/20 0800)      Intake/Output Summary (Last 24 hours) at 9/30/2020 1219  Last data filed at 9/30/2020 0800  Gross per 24 hour   Intake 689.58 ml   Output 500 ml   Net 189.58 ml       Patient is in no apparent distress. HEENT: Head is normocephalic and atraumatic. Pupils are round, equal, reactive to light. Sclerae are anicteric. Oropharynx clear. Neck: no cervical lymphadenopathy or thyromegaly. Lungs: good air entry, bl exp rhonchi. Trachea at the midline. Cardiovascular system: S1, S2, regular rate and rhythm. No murmurs, gallops or rubs. No jvd. Carotid upstroke 2 + bilaterally. Abdomen: soft, non tender, non distended. Positive bowel sounds. No hepatosplenomegaly. No abdominal bruits. Suprapubic catheter in place, exit site is clear, mild suprapubic tenderness. Extremities: no clubbing, cyanosis or edema. Strong dorsalis pedis pulses. Brisk capillary refill on the toes bilaterally. Integumentary: skin is grossly intact. Neurologic: Alert, oriented time three. Cooperative and appropriate. No gross motor or sensory deficits except rt facial weakness.        Data Review:    Labs: Results:       Chemistry Recent Labs     09/30/20 0400 09/29/20 2040 09/29/20  1755   GLU 95 95 92    136 136   K 3.2* 3.4* 3.5    103 103   CO2 25 22 25   BUN 47* 49* 50*   CREA 2.89* 3.18* 3.39*   CA 7.6* 8.0* 8.5   AGAP 8 11 8   BUCR 16 15 15   AP 65 66  --    TP 6.4 6.7  --    ALB 2.2* 2.3*  --    GLOB 4.2* 4.4*  --    AGRAT 0.5* 0.5*  --          CBC w/Diff Recent Labs     09/30/20  0400 09/29/20 2040 09/29/20  1755   WBC 8.8  --  10.0   RBC 2.20*  --  2.61*   HGB 5.8* 6.1* 6.9*   HCT 18.2* 19.4* 21.9*     --  458*   GRANS 80*  --  76*   LYMPH 15*  -- 18*   EOS 0  --  0         Iron/Ferritin No results for input(s): IRON in the last 72 hours. No lab exists for component: TIBCCALC   PTH/VIT D No results for input(s): PTH in the last 72 hours.     No lab exists for component: VITD           Zoya Phillips M.D  [facility]  Office [phone]  Pager [phone]    September 30, 2020

## 2020-09-30 NOTE — PROGRESS NOTES
In chart to precept primary RN Tiburcio Mann. 1700:  Pts suprapubic catheter came out. Dr. Miguel Chris notified. Paged the urologist on call, dr. Erminio Landau and received a verbal order to place an 25 french urinary catheter. 0029: Spoke with Ilana Caraballo in the lab and informed her that all of the urine collected at 1145 was contaminated with normal saline from the inflation balloon. I asked that lab please cancel/invalidate those results. A new correct specimen was collected and sent down at 1800 and all new orders were placed for culture, urea nitrogen, creatinine, and sodium URs.

## 2020-09-30 NOTE — PROGRESS NOTES
0100 TRANSFER - IN REPORT:    Verbal report received from [facility] (name) on [place]  being received from Nicole SWEENEY(unit) for routine progression of care      Report consisted of patients Situation, Background, Assessment and   Recommendations(SBAR). Information from the following report(s) SBAR, Kardex, Intake/Output, MAR, Accordion, Recent Results, Med Rec Status, Cardiac Rhythm SINUS TACH and Alarm Parameters  was reviewed with the receiving nurse. Opportunity for questions and clarification was provided. Assessment completed upon patients arrival to unit and care assumed.

## 2020-09-30 NOTE — DIABETES MGMT
Nutrition Assessment/Glycemic Control    Type and Reason for Visit: Initial    Nutrition Recommendations/Plan: Add 2g Na to current regular diet. Nutrition Assessment:   Pt is well nourished. · SOLITARIO. Hypokalemia. · HTN, controlled. · Resp infection, covid is being r/o. · Complicated uti    · Severe anemia. Malnutrition Assessment:  Malnutrition Status:  No malnutrition    Estimated Daily Nutrient Needs:  Energy (kcal):  1540  Protein (g):  72       Fluid (ml/day):  1800  Nutrition Related Findings:     Observed pt eating well at the lunch meal.  Pt is overweight related to excess caloric intake as evidenced by 138% ideal weight and BMI= 28.2kg/m2. Pt meets criteria for obesity. Wounds:    None     Current Nutrition Therapies:  DIET REGULAR -adding 2g Na    Anthropometric Measures:  · Height:  5' 3\" (160 cm)  · Current Body Wt:  72.1 kg (158 lb 15.2 oz)   · Admission Body Wt:  158 lb 15.2 oz      · Ideal Body Wt:  115 lbs:  138.2 %   ·  BMI:     28.2 kg/m2  · BMI Category: Overweight (BMI 25.0-29. 9)       Nutrition Diagnosis:   Altered nutrition-related lab values related to renal dysfunction as evidenced by Cr Cl 16 ml/min. Nutrition Interventions:   Food and/or Nutrient Delivery: Modify current diet- add 2 g Na  Goals:      Intake will meet >75% of energy and protein requirements. Glucose will be within target range.     Nutrition Monitoring and Evaluation:       Food/Nutrient Intake Outcomes: Food and nutrient intake  Physical Signs/Symptoms Outcomes:  wt , labs     Discharge Planning:    No discharge needs at this time     Electronically signed by Jonas Giang RD on 9/30/2020 at 2:06PM

## 2020-09-30 NOTE — PROGRESS NOTES
Progress Note      Patient: Daisy Guajardo               Sex: female          DOA: 9/29/2020       YOB: 1945      Age:  75 y.o.        LOS:  LOS: 1 day             CHIEF COMPLAINT:  UTI, Neurogenic bladder    Subjective:     Patient is awake and talking  No distress  She inadvertently dislodged her suprapubic catheter     Objective:      Visit Vitals  BP (!) 130/56   Pulse 96   Temp 98.8 °F (37.1 °C)   Resp 23   Ht 5' 3\" (1.6 m)   Wt 72.1 kg (159 lb)   SpO2 96%   Breastfeeding No   BMI 28.17 kg/m²       Physical Exam:  Gen:  Patient is in no distress. No complaint  HEENT and Neck:  PERRL, No cervical tenderness or masses  Lungs:  Clear bilaterally. No rales, no wheeze. Normal effort. Heart:  Regular Rate and Rhythm. No murmur or gallop. No JVD. No edema. Abdomen:  Soft, non tender, no masses, no Hepatosplenomegally  Extremities:  No digital clubbing, no cyanosis  Neuro:  Alert and oriented, Normal affect, Cranial nerves intact, No sensory deficits        Lab/Data Reviewed:  BMP:   Lab Results   Component Value Date/Time     09/30/2020 04:00 AM    K 3.2 (L) 09/30/2020 04:00 AM     09/30/2020 04:00 AM    CO2 25 09/30/2020 04:00 AM    AGAP 8 09/30/2020 04:00 AM    GLU 95 09/30/2020 04:00 AM    BUN 47 (H) 09/30/2020 04:00 AM    CREA 2.89 (H) 09/30/2020 04:00 AM    GFRAA 19 (L) 09/30/2020 04:00 AM    GFRNA 16 (L) 09/30/2020 04:00 AM     CBC:   Lab Results   Component Value Date/Time    WBC 8.8 09/30/2020 04:00 AM    HGB 5.8 (LL) 09/30/2020 04:00 AM    HCT 18.2 (L) 09/30/2020 04:00 AM     09/30/2020 04:00 AM           Assessment/Plan     Principal Problem:    UTI (urinary tract infection) (9/29/2020)      Overview: Patient has a Chronic Suprapubic Catheter at home.     Active Problems:    Neurogenic bladder (9/5/2012)      Recurrent UTI (9/5/2012)      Stricture, urethra (12/11/2015)      Bladder stones (12/11/2015)      Stage 3 acute kidney injury ([facility] [address] (9/29/2020) Overview: Possibly due to UTI, possibly due to reduced PO intake. Unclear upon       admission (9/29/2020). Symptomatic anemia (9/29/2020)      Overview: Consider Iron Deficiency Anemia. Elevated d-dimer (9/29/2020)      Thrombocytosis (HCC) (9/29/2020)      Overview: Consider Iron Deficiency Anemia. CKD (chronic kidney disease) (9/30/2020)      Overview: Stage II-IIIA? Abnormal antibody titer (9/30/2020)      Overview: Patient reportedly has developed one or more Abnormal Antibodies, which       was detected in her Type & Screen. Additional compatibility testing will       be required for Blood Transfusion. Plan:  Continue IV antibiotics  Reaching out to Urology regarding replacement of her suprapubic catheter  Follow renal function  BP control  Follow COVID result  DVT prophylaxis.

## 2020-10-01 LAB
ALBUMIN SERPL-MCNC: 2.1 G/DL (ref 3.4–5)
ANION GAP SERPL CALC-SCNC: 6 MMOL/L (ref 3–18)
ANION GAP SERPL CALC-SCNC: 7 MMOL/L (ref 3–18)
BUN SERPL-MCNC: 36 MG/DL (ref 7–18)
BUN SERPL-MCNC: 37 MG/DL (ref 7–18)
BUN/CREAT SERPL: 16 (ref 12–20)
BUN/CREAT SERPL: 16 (ref 12–20)
CA-I SERPL-SCNC: 1.13 MMOL/L (ref 1.12–1.32)
CALCIUM SERPL-MCNC: 7.2 MG/DL (ref 8.5–10.1)
CALCIUM SERPL-MCNC: 7.3 MG/DL (ref 8.5–10.1)
CALLED TO:,BCALL1: NORMAL
CALLED TO:,BCALL2: NORMAL
CHLORIDE SERPL-SCNC: 110 MMOL/L (ref 100–111)
CHLORIDE SERPL-SCNC: 110 MMOL/L (ref 100–111)
CO2 SERPL-SCNC: 24 MMOL/L (ref 21–32)
CO2 SERPL-SCNC: 25 MMOL/L (ref 21–32)
CREAT SERPL-MCNC: 2.29 MG/DL (ref 0.6–1.3)
CREAT SERPL-MCNC: 2.29 MG/DL (ref 0.6–1.3)
ERYTHROCYTE [DISTWIDTH] IN BLOOD BY AUTOMATED COUNT: 16.7 % (ref 11.6–14.5)
GLUCOSE SERPL-MCNC: 86 MG/DL (ref 74–99)
GLUCOSE SERPL-MCNC: 86 MG/DL (ref 74–99)
HCT VFR BLD AUTO: 21 % (ref 35–45)
HCT VFR BLD AUTO: 25.7 % (ref 35–45)
HGB BLD-MCNC: 6.5 G/DL (ref 12–16)
HGB BLD-MCNC: 8.3 G/DL (ref 12–16)
HISTORY CHECKED?,CKHIST: NORMAL
MAGNESIUM SERPL-MCNC: 1.5 MG/DL (ref 1.6–2.6)
MAGNESIUM SERPL-MCNC: 2.4 MG/DL (ref 1.6–2.6)
MCH RBC QN AUTO: 26.4 PG (ref 24–34)
MCHC RBC AUTO-ENTMCNC: 31 G/DL (ref 31–37)
MCV RBC AUTO: 85.4 FL (ref 74–97)
PHOSPHATE SERPL-MCNC: 3.7 MG/DL (ref 2.5–4.9)
PLATELET # BLD AUTO: 390 K/UL (ref 135–420)
PMV BLD AUTO: 9.1 FL (ref 9.2–11.8)
POTASSIUM SERPL-SCNC: 3.9 MMOL/L (ref 3.5–5.5)
POTASSIUM SERPL-SCNC: 3.9 MMOL/L (ref 3.5–5.5)
POTASSIUM SERPL-SCNC: 4.2 MMOL/L (ref 3.5–5.5)
RBC # BLD AUTO: 2.46 M/UL (ref 4.2–5.3)
SARS-COV-2, COV2NT: NOT DETECTED
SODIUM SERPL-SCNC: 141 MMOL/L (ref 136–145)
SODIUM SERPL-SCNC: 141 MMOL/L (ref 136–145)
UUN UR-MCNC: 478 MG/DL
WBC # BLD AUTO: 9 K/UL (ref 4.6–13.2)

## 2020-10-01 PROCEDURE — 74011250636 HC RX REV CODE- 250/636: Performed by: HOSPITALIST

## 2020-10-01 PROCEDURE — 74011250636 HC RX REV CODE- 250/636: Performed by: INTERNAL MEDICINE

## 2020-10-01 PROCEDURE — 86922 COMPATIBILITY TEST ANTIGLOB: CPT

## 2020-10-01 PROCEDURE — 2709999900 HC NON-CHARGEABLE SUPPLY

## 2020-10-01 PROCEDURE — 86921 COMPATIBILITY TEST INCUBATE: CPT

## 2020-10-01 PROCEDURE — 90471 IMMUNIZATION ADMIN: CPT

## 2020-10-01 PROCEDURE — 65270000029 HC RM PRIVATE

## 2020-10-01 PROCEDURE — 36430 TRANSFUSION BLD/BLD COMPNT: CPT

## 2020-10-01 PROCEDURE — 77030021352 HC CBL LD SYS DISP COVD -B

## 2020-10-01 PROCEDURE — 83735 ASSAY OF MAGNESIUM: CPT

## 2020-10-01 PROCEDURE — P9016 RBC LEUKOCYTES REDUCED: HCPCS

## 2020-10-01 PROCEDURE — 85027 COMPLETE CBC AUTOMATED: CPT

## 2020-10-01 PROCEDURE — 74011000258 HC RX REV CODE- 258: Performed by: INTERNAL MEDICINE

## 2020-10-01 PROCEDURE — 84132 ASSAY OF SERUM POTASSIUM: CPT

## 2020-10-01 PROCEDURE — 74011250637 HC RX REV CODE- 250/637: Performed by: HOSPITALIST

## 2020-10-01 PROCEDURE — 80069 RENAL FUNCTION PANEL: CPT

## 2020-10-01 PROCEDURE — 90686 IIV4 VACC NO PRSV 0.5 ML IM: CPT | Performed by: INTERNAL MEDICINE

## 2020-10-01 PROCEDURE — 85018 HEMOGLOBIN: CPT

## 2020-10-01 PROCEDURE — 82330 ASSAY OF CALCIUM: CPT

## 2020-10-01 PROCEDURE — 74011250637 HC RX REV CODE- 250/637: Performed by: INTERNAL MEDICINE

## 2020-10-01 RX ORDER — ACETAMINOPHEN 325 MG/1
650 TABLET ORAL
Status: COMPLETED | OUTPATIENT
Start: 2020-10-01 | End: 2020-10-01

## 2020-10-01 RX ORDER — VANCOMYCIN 2 GRAM/500 ML IN 0.9 % SODIUM CHLORIDE INTRAVENOUS
2000 ONCE
Status: COMPLETED | OUTPATIENT
Start: 2020-10-01 | End: 2020-10-01

## 2020-10-01 RX ORDER — MAGNESIUM SULFATE HEPTAHYDRATE 40 MG/ML
2 INJECTION, SOLUTION INTRAVENOUS ONCE
Status: COMPLETED | OUTPATIENT
Start: 2020-10-01 | End: 2020-10-01

## 2020-10-01 RX ORDER — VANCOMYCIN 2 GRAM/500 ML IN 0.9 % SODIUM CHLORIDE INTRAVENOUS
2000 ONCE
Status: DISCONTINUED | OUTPATIENT
Start: 2020-10-01 | End: 2020-10-01

## 2020-10-01 RX ORDER — SODIUM CHLORIDE 9 MG/ML
250 INJECTION, SOLUTION INTRAVENOUS AS NEEDED
Status: DISCONTINUED | OUTPATIENT
Start: 2020-10-01 | End: 2020-10-05 | Stop reason: HOSPADM

## 2020-10-01 RX ADMIN — Medication 10 ML: at 14:38

## 2020-10-01 RX ADMIN — HYDRALAZINE HYDROCHLORIDE 50 MG: 50 TABLET, FILM COATED ORAL at 16:00

## 2020-10-01 RX ADMIN — POTASSIUM CHLORIDE 40 MEQ: 1500 TABLET, EXTENDED RELEASE ORAL at 03:48

## 2020-10-01 RX ADMIN — DILTIAZEM HYDROCHLORIDE 240 MG: 240 CAPSULE, COATED, EXTENDED RELEASE ORAL at 08:36

## 2020-10-01 RX ADMIN — MAGNESIUM SULFATE HEPTAHYDRATE 2 G: 40 INJECTION, SOLUTION INTRAVENOUS at 05:16

## 2020-10-01 RX ADMIN — Medication 10 ML: at 21:37

## 2020-10-01 RX ADMIN — THERA TABS 1 TABLET: TAB at 08:36

## 2020-10-01 RX ADMIN — FAMOTIDINE 40 MG: 10 INJECTION, SOLUTION INTRAVENOUS at 10:11

## 2020-10-01 RX ADMIN — AZITHROMYCIN MONOHYDRATE 500 MG: 500 INJECTION, POWDER, LYOPHILIZED, FOR SOLUTION INTRAVENOUS at 15:11

## 2020-10-01 RX ADMIN — HYDRALAZINE HYDROCHLORIDE 50 MG: 50 TABLET, FILM COATED ORAL at 21:45

## 2020-10-01 RX ADMIN — POTASSIUM CHLORIDE 40 MEQ: 1500 TABLET, EXTENDED RELEASE ORAL at 00:07

## 2020-10-01 RX ADMIN — ACETAMINOPHEN 650 MG: 325 TABLET, FILM COATED ORAL at 10:11

## 2020-10-01 RX ADMIN — Medication 10 ML: at 05:16

## 2020-10-01 RX ADMIN — VANCOMYCIN HYDROCHLORIDE 2000 MG: 10 INJECTION, POWDER, LYOPHILIZED, FOR SOLUTION INTRAVENOUS at 17:00

## 2020-10-01 RX ADMIN — HYDRALAZINE HYDROCHLORIDE 50 MG: 50 TABLET, FILM COATED ORAL at 08:36

## 2020-10-01 RX ADMIN — ACETAMINOPHEN 650 MG: 325 TABLET, FILM COATED ORAL at 21:45

## 2020-10-01 RX ADMIN — INFLUENZA VIRUS VACCINE 0.5 ML: 15; 15; 15; 15 SUSPENSION INTRAMUSCULAR at 19:48

## 2020-10-01 RX ADMIN — MONTELUKAST 10 MG: 10 TABLET, FILM COATED ORAL at 21:45

## 2020-10-01 RX ADMIN — ASPIRIN 81 MG CHEWABLE TABLET 81 MG: 81 TABLET CHEWABLE at 08:36

## 2020-10-01 RX ADMIN — POTASSIUM CHLORIDE AND SODIUM CHLORIDE: 900; 150 INJECTION, SOLUTION INTRAVENOUS at 21:17

## 2020-10-01 RX ADMIN — CEFTRIAXONE 1 G: 1 INJECTION, POWDER, FOR SOLUTION INTRAMUSCULAR; INTRAVENOUS at 21:17

## 2020-10-01 NOTE — DIABETES MGMT
Nutrition Re-Assessment and Glycemic Control     Type and Reason for Visit: Positive nutrition screen - for weight loss;  Reassess    Nutrition Recommendations/Plan:   Try No Added Sugar [place] Instant Breakfast TID while po intake is sub-optimal.    Nutrition Assessment:     Pt appears well nourished but with 11# weight loss in past 6 months (7% weight loss, not significant but noted). Sub optimal po intake   today. Malnutrition Assessment:  Malnutrition Status:  No malnutrition      Estimated Daily Nutrient Needs:  Energy (kcal):  1540  Protein (g):  72       Fluid (ml/day):  1800    Nutrition Related Findings:  Spoke with pt via phone (COVID r/o). Pt reports unintentional weight loss since March 2020 (d/t stress of COVID, depression, loss of appetite). She reports having a poor appetite at this time. PO intake at lunch - < 100 calories. Drinks strawberry Boost at home and does not like Ensure. Pt is agreeable to try [employer] (no available in strawberry flavor but agrees to try vanilla. Receiving Theragran    Wounds:    None       Current Nutrition Therapies:  DIET REGULAR 2 GM NA (House Low NA)    Anthropometric Measures:  · Height:  5' 3\" (160 cm)  · Current Body Wt:  72.1 kg (158 lb 15.2 oz)(9/30/20)   · Admission Body Wt:  158 lb 15.2 oz    · Usual Body Wt:  170 lb(March 2020 per pt)     · Ideal Body Wt:  115 lbs:  138.2 %   · BMI Category: Overweight (BMI 25.0-29. 9)       Nutrition Diagnosis:   · Altered nutrition-related lab values related to renal dysfunction as evidenced by lab values    Nutrition Interventions:   Food and/or Nutrient Delivery: Start oral nutrition supplement  Coordination of Nutrition Care: Continued inpatient monitoring    Goals:  PO intake will meet >75% of patient estimated nutritional needs within the next 7 days. Blood glucose will be in target range (70 to 180 mg/dL) within 3 days.         Nutrition Monitoring and Evaluation:   Food/Nutrient Intake Outcomes: Food and nutrient intake  Physical Signs/Symptoms Outcomes:      Discharge Planning:    No discharge needs at this time   Electronically signed by Breanna Madsen RD on 10/1/2020 at 12:52 PM  Contact:   Breanna Madsen, [address], CDE   Office:  [address] [place] [address] Pager:  [phone]

## 2020-10-01 NOTE — PROGRESS NOTES
Problem: Infection - Risk of, Urinary Catheter-Associated Urinary Tract Infection  Goal: *Absence of infection signs and symptoms  Outcome: Progressing Towards Goal     Problem: Patient Education: Go to Patient Education Activity  Goal: Patient/Family Education  Outcome: Progressing Towards Goal     Problem: Falls - Risk of  Goal: *Absence of Falls  Description: Document Alana Fall Risk and appropriate interventions in the flowsheet. Outcome: Progressing Towards Goal  Note: Fall Risk Interventions:            Medication Interventions: Bed/chair exit alarm                   Problem: Patient Education: Go to Patient Education Activity  Goal: Patient/Family Education  Outcome: Progressing Towards Goal     Problem: Pressure Injury - Risk of  Goal: *Prevention of pressure injury  Description: Document Wily Scale and appropriate interventions in the flowsheet. Outcome: Progressing Towards Goal  Note: Pressure Injury Interventions:             Activity Interventions: Increase time out of bed    Mobility Interventions: HOB 30 degrees or less    Nutrition Interventions: Document food/fluid/supplement intake    Friction and Shear Interventions: HOB 30 degrees or less                Problem: Patient Education: Go to Patient Education Activity  Goal: Patient/Family Education  Outcome: Progressing Towards Goal     Problem: Nutrition Deficit  Goal: *Optimize nutritional status  Outcome: Progressing Towards Goal     Problem: Patient Education: Go to Patient Education Activity  Goal: Patient/Family Education  Outcome: Progressing Towards Goal     Problem: Discharge Planning  Goal: *Discharge to safe environment  Outcome: Progressing Towards Goal

## 2020-10-01 NOTE — PROGRESS NOTES
1900 Bedside shift change report given to [address] [place], [place] [address]ille [facility] Yael (oncoming nurse) by Mamadou Garcia (offgoing nurse). Report included the following information SBAR, Kardex, Procedure Summary, Intake/Output, MAR, Recent Results, Med Rec Status, Cardiac Rhythm NSR and Alarm Parameters . Delfin Bristol Dr. Nancylee Primrose at nurses station. Changed electrolyte protocol to tele replacement. 2000 Assessment. A&Ox4. Calm and cooperative. H&H and K drawn. 2240 + blood cx. Dr. Krista Astorga notified. 0000 Assessment. No changes. Stood at bedside with x1 assist. Gown changed. VSS.     0400 Assessment. No changes. Morning labs drawn. 0500 hgb low 6.5 Dr. Anisa Ybarra notified. 0700 Bedside shift change report given to [address] [place] [address] (oncoming nurse) by  [facility] Is[facility] Yael (offgoing nurse). Report included the following information SBAR, Kardex, Intake/Output, MAR, Recent Results, Med Rec Status, Cardiac Rhythm NSR and Alarm Parameters .

## 2020-10-01 NOTE — PROGRESS NOTES
0700:   Bedside and Verbal shift change report given to [facility] AT ASHLEY FREGOSO RN (oncoming nurse) by Jules Senior RN(offgoing nurse). Report included the following information SBAR, Procedure Summary, Intake/Output, MAR, Recent Results, Med Rec Status, Cardiac Rhythm NSR--> S-tach and Alarm Parameters; VSS. 1030:  NS w/ 20 mEq of K held d/t only having one line and blood transfusion about to begin. Will restart once IV antibiotics are completed d/t incompatibility. 1100:  Blood transfusion started. 1115: No transfusion reaction noticed. 1515:  Transfusion completed. 1700: Dr. Dafne Bell at bedside. 1715:  H/H post transfusion rechecked drawn. 1820:  Dr. Di Drew at the bedside. 1840:  Dr. Dafne Bell updated on COVID-19 negative result. Received order to transfer to medical floor. Droplet plus precautions discontinued. 1908:  TRANSFER - OUT REPORT:    Verbal report given to Che Duval RN(name) on Sanya Au  being transferred to 2200(unit) for routine progression of care       Report consisted of patients Situation, Background, Assessment and   Recommendations(SBAR). Information from the following report(s) SBAR, Intake/Output, Recent Results, Cardiac Rhythm S-tach and Alarm Parameters  was reviewed with the receiving nurse. Lines:   Peripheral IV 09/30/20 Right Antecubital (Active)   Site Assessment Clean, dry, & intact 10/01/20 1600   Phlebitis Assessment 0 10/01/20 1600   Infiltration Assessment 0 10/01/20 1600   Dressing Status Clean, dry, & intact 10/01/20 1600   Dressing Type Transparent 10/01/20 0800   Hub Color/Line Status Patent; Infusing;Pink 10/01/20 0800   Action Taken Open ports on tubing capped 10/01/20 0800   Alcohol Cap Used Yes 10/01/20 0800        Opportunity for questions and clarification was provided.       Patient transported with:   Registered Nurse              ---    ---    ---

## 2020-10-01 NOTE — PROGRESS NOTES
INTERIM UPDATE - 2024 EST on 9/30/2020    Nursing Staff reports that one of Patient's Blood Cultures drawn on 9/29/2020 has returned positive with growth of Gram (+) Cocci in Clusters in the Anaerobic Bottle. Patient is currently on IV Azithromycin and IV Ceftriaxone. Notably, Patient is in [address] [place] [address] for Rule Out of SARS-CoV-2 (Novel Coronavirus Covid-19) Infection and there has been an increase in false positives due to contamination for Patient's in isolation. Plan:  Redraw Blood Cultures x2 at 0900 EST on 10/01/2020 (36 hours after initial Blood Cultures obtained). Will hold off on broadening antibiotic coverage (Vancomycin) unless the 2nd bottle returns positive. INTERIM UPDATE - 2043 EST on 9/30/2020    Nursing Staff calls to report that an Anaerobic Bottle drawn on 9/29/2020 returned with Gram (+) Cocci. In the EHR it appears that two blood draws were performed ~10 minutes apart and that each has its Anaerobic Bottle positive; however, location is merely indicated as \"peripheral\" on each result without benefit of numbers or anything to differentiate the results except that the results appears in two different columns 10 minutes apart. Plan: Will start IV Vancomycin after AM Blood Cultures are obtained, as Patient ostensibly has two different Anaerobic Bottles positive for Gram (+) Cocci. Will draw additional Blood Cultures as planned in previous update. INTERIM UPDATE - [id number] EST on 10/01/2020    Nursing Staff reports that AM labs return with Hgb 6.5. Plan:  Order 1 unit PRBCs with IV Famotidine and PO Acetaminophen Transfusion Pre-Treatment and repeat H&H. Notably, this may take an extended period of time, as Patient has abnormal antibodies. Nursing Staff is aware. Blood Bank is aware.

## 2020-10-01 NOTE — PROGRESS NOTES
Pharmacy Dosing Services: Vancomycin    Indication: Bloodstream Infection    Day of therapy: 0 / x    Other Antimicrobials (Include dose, start day & day of therapy):  Azithromycin and Ceftriaxone  Current Antimicrobial Therapy (168h ago, onward)      Ordered     Start Stop    10/01/20 0958  vancomycin (VANCOCIN) 2000 mg in  ml infusion  2,000 mg,   IntraVENous,   ONCE      10/01/20 1700 10/02/20 0459    10/01/20 0835  VANCOMYCIN INFORMATION NOTE 1 Each  1 Each,   Other,   RX DOSING/MONITORING      10/01/20 0834 --    20 0709  azithromycin (ZITHROMAX) 500 mg in 0.9% sodium chloride 250 mL IVPB  500 mg,   IntraVENous,   EVERY 24 HOURS      20 1100 10/05/20 1359    20 215  cefTRIAXone (ROCEPHIN) 1 g in 0.9% sodium chloride (MBP/ADV) 50 mL MBP  1 g,   IntraVENous,   EVERY 24 HOURS      20 2200 10/04/20 215              Loading dose (date given): 2000 mg  Current Maintenance dose:      Goal Vancomycin Level: Vancomycin Trough: 15 - 20 mcg/mL (most infections)    Vancomycin Level (if drawn): No results for input(s): [employer] in the last 72 hours. Pt Weight Weight: 72.1 kg (159 lb)   Temperature Temp: 97.8 °F (36.6 °C)   Temp (72hrs), Av.5 °F (36.4 °C), Min:72.6 °F (22.6 °C), Max:100.3 °F (37.9 °C)     HR Pulse (Heart Rate): 100   BP BP: (!) 151/70       Recent Labs     10/01/20  0400 20  0400 20  1755   CREA 2.29*  2.29* 2.89* 3.18* 3.39*   BUN 37*  36* 47* 49* 50*   WBC 9.0 8.8  --  10.0     Estimated Creatinine Clearance Estimated Creatinine Clearance: 20.2 mL/min (A) (based on SCr of 2.29 mg/dL (H)).   Estimated Creatinine Clearance (using IBW): 17.6 mL/min      CAPD, Hemodialysis or Renal Replacement Therapy: N/A  Renal function stable? (unstable defined as SCr increase of 0.5 mg/dL or > 50% increase from baseline, whichever is greater) (Y/N): n     Significant Cultures:   Results       Procedure Component Value Units Date/Time    CULTURE, BLOOD [[id number]]     Order Status:  Canceled Specimen:  Blood     CULTURE, BLOOD [[id number]]     Order Status:  Canceled Specimen:  Blood     CULTURE, URINE [[id number]] Collected:  09/30/20 1800    Order Status:  Completed Specimen:  Cath Urine Updated:  10/01/20 1839     Special Requests: NO SPECIAL REQUESTS        Culture result:       CULTURE IN PROGRESS,FURTHER UPDATES TO FOLLOW          CULTURE, URINE [[id number]] Collected:  09/30/20 1145    Order Status:  Completed Specimen:  Clean catch Updated:  09/30/20 1353    CULTURE, RESPIRATORY/SPUTUM/BRONCH Criss [place] STAIN [[id number]]     Order Status:  Sent Specimen:  Sputum     CULTURE, BLOOD [[id number]]  (Abnormal) Collected:  09/29/20 2040    Order Status:  Completed Specimen:  Blood Updated:  10/01/20 1105     Special Requests: PERIPHERAL        GRAM STAIN ANAEROBIC BOTTLE               GRAM POSITIVE COCCI IN CLUSTERS                  SMEAR CALLED TO AND CORRECTLY REPEATED BY: [id number] RN 2700, AT 2015, ON 09/30/2020, TO J[facility]           Culture result:       GRAM POSITIVE COCCI IN CLUSTERS GROWING IN THIS PEDIATRIC BOTTLE          CULTURE, BLOOD [[id number]]  (Abnormal) Collected:  09/29/20 2030    Order Status:  Completed Specimen:  Blood Updated:  10/01/20 1104     Special Requests: PERIPHERAL        GRAM STAIN ANAEROBIC BOTTLE               GRAM POSITIVE COCCI IN CLUSTERS                  SMEAR CALLED TO AND CORRECTLY REPEATED BY: Julien Boeck RN 2700, AT 2237, ON 09/30/2020, TO JMB           Culture result:       GRAM POSITIVE COCCI IN CLUSTERS GROWING IN THIS PEDIATRIC BOTTLE                    Regimen assessment: New start, poor renal, watch renal may need to adjust, 1st trough TBD  Maintenance dose: 1250 mg IV every 48 hours  Next scheduled level: TBD       Pharmacy will follow daily and adjust medications as appropriate for renal function and/or serum levels.     Thank you,  Emeterio Germain 46 Pharmacist  [phone]

## 2020-10-01 NOTE — PROGRESS NOTES
19:05- Report received from ICU nurse, [facility], , [address] [place] [address] on patient being transferred to room 2203.  20:20-- Arrived to room 2203  21:55= Spoke with Sakina Freeman from [facility] regarding vancomycin and blood culture order for the A.M  23:20-Tele tech, Nolberto Lindsey called and reported that patient's heart rhythm has been going back and forth (unsustained) from sinus tachycardia to a-fib and presently in sinus tachycardia ddcp=334.  23:45- Spoke with patient and she reported that she has had a-fib and that it is not new- note that a-fib is not listed in her history. Patient has no c/o at this time. 22 G IV inserted in right hand vein with one attempt- IV fluids resumed. 00:01= Dr Maxine Andrews notified of  A- fib heart rhythm and sinus tachycardia as baseline heart rhythm also reported the earlier phone call from [facility] and asked if morning blood cultures are to be drawn== blood culture order to remain unchanged and no new orders received. 01:50--Melatonin 12 mg po for sleep  03:00- Resting quietly with eyes closed. Call light in reach. Continue to monitor. 04:00= No change in condition. COntinue to monitor. Call light in reach. 07:45= Report given to Baldev meadows RN.

## 2020-10-01 NOTE — PROGRESS NOTES
Day 2 Attempt for OT evaluation: per chair review, low H/H (6.5/21.0), receiving blood transfusion today. OT to f/u s/p lab results following transfusion to ensure safety with OT evaluation.     Harvie Hammans, [address] L[address] 86 OTR/L  [phone]

## 2020-10-01 NOTE — PROGRESS NOTES
Per chart review, remains with low H/H (6.5/210) and is pending additional unit of RBC. Will follow up pending lab results post transfusion to maximize patient participation and safety in skilled PT evaluation.

## 2020-10-01 NOTE — PROGRESS NOTES
In Patient Progress note      Admit Date: 9/29/2020          Impression:     1. SOLITARIO. Etio- volume depletion in a setting of resp infection/uti/severe anemia. 2. Hypokalemia. Resolved   3. HTN, controlled. 4. Resp infection, covid is being r/o.   5. Complicated uti in a setting of chronic suprapubic catheter. On abx. 6    Severe anemia.  Getting PRBC     Plan:  1  Continue IVF NS @ 75 cc/hrs   2  Strict I/o , daily wts ,   3  Renally dose AB   4  Avoid IV contrast /nephrotoxins   5  Follow renal parameters and electrolytes  daily     Please call with questions    Chad Gibson MD FASN  Cell [phone]  Pager: [phone]        Subjective:      Poor appetite   No SOB/CP        Current Facility-Administered Medications:     0.9% sodium chloride infusion 250 mL, 250 mL, IntraVENous, PRN, Dereck Ovalle DO    VANCOMYCIN INFORMATION NOTE 1 Each, 1 Each, Other, Rx Dosing/Monitoring, Rodrigo Ovalle DO    vancomycin (VANCOCIN) 2000 mg in  ml infusion, 2,000 mg, IntraVENous, ONCE, Rodrigo Ovalle DO, Last Rate: 250 mL/hr at 10/01/20 1700, 2,000 mg at 10/01/20 1700    aspirin chewable tablet 81 mg, 81 mg, Oral, DAILY, Rodrigo Ovalle DO, 81 mg at 10/01/20 0836    dilTIAZem ER (CARDIZEM CD) capsule 240 mg, 240 mg, Oral, DAILY, Rodrigo Ovalle DO, 240 mg at 10/01/20 0836    hydrALAZINE (APRESOLINE) tablet 50 mg, 50 mg, Oral, TID, Rodrigo Ovalle DO, 50 mg at 10/01/20 1600    montelukast (SINGULAIR) tablet 10 mg, 10 mg, Oral, QHS, Rodrigo Ovalle DO, 10 mg at 09/30/20 2146    therapeutic multivitamin (THERAGRAN) tablet 1 Tab, 1 Tab, Oral, DAILY, Rodrigo Ovalle DO, 1 Tab at 10/01/20 0836    sodium chloride (NS) flush 5-40 mL, 5-40 mL, IntraVENous, Q8H, Rodrigo Ovalle DO, 10 mL at 10/01/20 1438    sodium chloride (NS) flush 5-40 mL, 5-40 mL, IntraVENous, PRN, Rodrigo Ovalle, DO    ondansetron (ZOFRAN) injection 4 mg, 4 mg, IntraVENous, Q6H PRN, Rodrigo Ovalle,     acetaminophen (TYLENOL) tablet 650 mg, 650 mg, Oral, Q6H PRN, Rodrigo Ovalle DO    melatonin tablet 12 mg, 12 mg, Oral, QHS PRN, Rodrigo Ovalle DO, 12 mg at 09/30/20 0144    docusate sodium (COLACE) capsule 100 mg, 100 mg, Oral, BID PRN, Rodrigo Ovalle DO    albuterol-ipratropium (DUO-NEB) 2.5 MG-0.5 MG/3 ML, 3 mL, Nebulization, Q6H PRN, Minnie Han DO    influenza vaccine 2020-21 (6 mos+)(PF) (FLUARIX/FLULAVAL/FLUZONE QUAD) injection 0.5 mL, 0.5 mL, IntraMUSCular, PRIOR TO DISCHARGE, Rodrigo Ovalle DO    azithromycin (ZITHROMAX) 500 mg in 0.9% sodium chloride 250 mL IVPB, 500 mg, IntraVENous, Q24H, Rodrigo Ovalle DO, Last Rate: 250 mL/hr at 10/01/20 1511, 500 mg at 10/01/20 1511    pantoprazole (PROTONIX) tablet 40 mg, 40 mg, Oral, DAILY PRN, Krystian Tony MD    0.9% sodium chloride with KCl 20 mEq/L infusion, , IntraVENous, CONTINUOUS, Chelsea Sam MD, Stopped at 10/01/20 1030    0.9% sodium chloride infusion 250 mL, 250 mL, IntraVENous, PRN, Rodrigo Ovalle DO    cefTRIAXone (ROCEPHIN) 1 g in 0.9% sodium chloride (MBP/ADV) 50 mL MBP, 1 g, IntraVENous, Q24H, Rodrigo Ovalle DO, Stopped at 09/30/20 2215    guaiFENesin ER (MUCINEX) tablet 600 mg, 600 mg, Oral, Q12H, Rodrigo Ovalle DO, Stopped at 10/01/20 0900        Objective:     Visit Vitals  /66 (BP Patient Position: At rest)   Pulse 92   Temp 97.8 °F (36.6 °C)   Resp 20   Ht 5' 3\" (1.6 m)   Wt 72.1 kg (159 lb)   SpO2 99%   Breastfeeding No   BMI 28.17 kg/m²         Intake/Output Summary (Last 24 hours) at 10/1/2020 1722  Last data filed at 10/1/2020 1511  Gross per 24 hour   Intake 2162.2 ml   Output 650 ml   Net 1512.2 ml       Physical Exam:     Patient is in no apparent distress. HEENT: mmm  Lungs: good air entry, bl exp rhonchi. Cardiovascular system: S1, S2, regular rate and rhythm. Abdomen: soft, non tender, non distended. Extremities: no clubbing, cyanosis or edema.   Integumentary: skin is grossly intact.       Data Review:    Recent Labs     10/01/20  0400   WBC 9.0   RBC 2.46*   HCT 21.0*   MCV 85.4   MCH 26.4   MCHC 31.0   RDW 16.7*     Recent Labs     10/01/20  1025 10/01/20  0400 09/30/20  2030 09/30/20  0400 09/29/20  2040 09/29/20  1755   BUN  --  37*  36*  --  47* 49* 50*   CREA  --  2.29*  2.29*  --  2.89* 3.18* 3.39*   CA  --  7.2*  7.3*  --  7.6* 8.0* 8.5   ALB  --  2.1*  --  2.2* 2.3*  --    K 4.2 3.9  3.9 3.4* 3.2* 3.4* 3.5   NA  --  141  141  --  139 136 136   CL  --  110  110  --  106 103 103   CO2  --  25  24  --  25 22 25   PHOS  --  3.7  --   --   --   --    GLU  --  86  86  --  95 95 92       Betty Harris MD

## 2020-10-02 PROBLEM — A41.9 SEPSIS (HCC): Status: ACTIVE | Noted: 2020-10-02

## 2020-10-02 LAB
ALBUMIN SERPL-MCNC: 2.3 G/DL (ref 3.4–5)
ANION GAP SERPL CALC-SCNC: 7 MMOL/L (ref 3–18)
BACTERIA SPEC CULT: NORMAL
BUN SERPL-MCNC: 26 MG/DL (ref 7–18)
BUN/CREAT SERPL: 14 (ref 12–20)
CA-I SERPL-SCNC: 1.21 MMOL/L (ref 1.12–1.32)
CALCIUM SERPL-MCNC: 7.8 MG/DL (ref 8.5–10.1)
CC UR VC: NORMAL
CHLORIDE SERPL-SCNC: 111 MMOL/L (ref 100–111)
CO2 SERPL-SCNC: 23 MMOL/L (ref 21–32)
CREAT SERPL-MCNC: 1.85 MG/DL (ref 0.6–1.3)
ERYTHROCYTE [DISTWIDTH] IN BLOOD BY AUTOMATED COUNT: 16.3 % (ref 11.6–14.5)
GLUCOSE SERPL-MCNC: 85 MG/DL (ref 74–99)
HCT VFR BLD AUTO: 26.2 % (ref 35–45)
HGB BLD-MCNC: 8.1 G/DL (ref 12–16)
MAGNESIUM SERPL-MCNC: 1.9 MG/DL (ref 1.6–2.6)
MCH RBC QN AUTO: 26.6 PG (ref 24–34)
MCHC RBC AUTO-ENTMCNC: 30.9 G/DL (ref 31–37)
MCV RBC AUTO: 85.9 FL (ref 74–97)
PHOSPHATE SERPL-MCNC: 3.7 MG/DL (ref 2.5–4.9)
PLATELET # BLD AUTO: 397 K/UL (ref 135–420)
PMV BLD AUTO: 9.5 FL (ref 9.2–11.8)
POTASSIUM SERPL-SCNC: 3.9 MMOL/L (ref 3.5–5.5)
RBC # BLD AUTO: 3.05 M/UL (ref 4.2–5.3)
SERVICE CMNT-IMP: NORMAL
SODIUM SERPL-SCNC: 141 MMOL/L (ref 136–145)
WBC # BLD AUTO: 9.6 K/UL (ref 4.6–13.2)

## 2020-10-02 PROCEDURE — 74011250636 HC RX REV CODE- 250/636: Performed by: INTERNAL MEDICINE

## 2020-10-02 PROCEDURE — 82330 ASSAY OF CALCIUM: CPT

## 2020-10-02 PROCEDURE — 97162 PT EVAL MOD COMPLEX 30 MIN: CPT

## 2020-10-02 PROCEDURE — 80069 RENAL FUNCTION PANEL: CPT

## 2020-10-02 PROCEDURE — 85027 COMPLETE CBC AUTOMATED: CPT

## 2020-10-02 PROCEDURE — 83735 ASSAY OF MAGNESIUM: CPT

## 2020-10-02 PROCEDURE — 36415 COLL VENOUS BLD VENIPUNCTURE: CPT

## 2020-10-02 PROCEDURE — 97116 GAIT TRAINING THERAPY: CPT

## 2020-10-02 PROCEDURE — 74011250637 HC RX REV CODE- 250/637: Performed by: INTERNAL MEDICINE

## 2020-10-02 PROCEDURE — 87040 BLOOD CULTURE FOR BACTERIA: CPT

## 2020-10-02 PROCEDURE — 65270000029 HC RM PRIVATE

## 2020-10-02 PROCEDURE — 74011000258 HC RX REV CODE- 258: Performed by: INTERNAL MEDICINE

## 2020-10-02 PROCEDURE — 97166 OT EVAL MOD COMPLEX 45 MIN: CPT

## 2020-10-02 RX ORDER — AMLODIPINE BESYLATE 10 MG/1
10 TABLET ORAL DAILY
Status: DISCONTINUED | OUTPATIENT
Start: 2020-10-03 | End: 2020-10-04 | Stop reason: ALTCHOICE

## 2020-10-02 RX ADMIN — POTASSIUM CHLORIDE AND SODIUM CHLORIDE: 900; 150 INJECTION, SOLUTION INTRAVENOUS at 13:44

## 2020-10-02 RX ADMIN — MONTELUKAST 10 MG: 10 TABLET, FILM COATED ORAL at 22:21

## 2020-10-02 RX ADMIN — Medication 10 ML: at 22:21

## 2020-10-02 RX ADMIN — HYDRALAZINE HYDROCHLORIDE 50 MG: 50 TABLET, FILM COATED ORAL at 22:28

## 2020-10-02 RX ADMIN — CEFTRIAXONE 1 G: 1 INJECTION, POWDER, FOR SOLUTION INTRAMUSCULAR; INTRAVENOUS at 22:20

## 2020-10-02 RX ADMIN — THERA TABS 1 TABLET: TAB at 08:45

## 2020-10-02 RX ADMIN — Medication 12 MG: at 01:49

## 2020-10-02 RX ADMIN — HYDRALAZINE HYDROCHLORIDE 50 MG: 50 TABLET, FILM COATED ORAL at 18:11

## 2020-10-02 RX ADMIN — DILTIAZEM HYDROCHLORIDE 240 MG: 240 CAPSULE, COATED, EXTENDED RELEASE ORAL at 08:45

## 2020-10-02 RX ADMIN — Medication 12 MG: at 22:30

## 2020-10-02 RX ADMIN — HYDRALAZINE HYDROCHLORIDE 50 MG: 50 TABLET, FILM COATED ORAL at 08:44

## 2020-10-02 RX ADMIN — Medication 10 ML: at 14:00

## 2020-10-02 RX ADMIN — ASPIRIN 81 MG CHEWABLE TABLET 81 MG: 81 TABLET CHEWABLE at 08:44

## 2020-10-02 RX ADMIN — AZITHROMYCIN MONOHYDRATE 500 MG: 500 INJECTION, POWDER, LYOPHILIZED, FOR SOLUTION INTRAVENOUS at 18:18

## 2020-10-02 NOTE — PROGRESS NOTES
Problem: Discharge Planning  Goal: *Discharge to safe environment  Outcome: Met  Plan is home    Chart reviewed. Per Dr Sridevi Zapata, patient may be discharged today or soon. 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 So Home Health will not be an option. OT Recommends shower chair and grab bars, those items can be obtained after dc, insurance do not pay for them. Care Management Interventions  PCP Verified by CM: Yes  Palliative Care Criteria Met (RRAT>21 & CHF Dx)?: No  Mode of Transport at Discharge: Other (see comment)  Discharge Durable Medical Equipment: No  Physical Therapy Consult: No  Occupational Therapy Consult: No  Speech Therapy Consult: No  Current Support Network: Lives Alone  Confirm Follow Up Transport: Family  The Patient and/or Patient Representative was Provided with a Choice of Provider and Agrees with the Discharge Plan?: No  Freedom of Choice List was Provided with Basic Dialogue that Supports the Patient's Individualized Plan of Care/Goals, Treatment Preferences and Shares the Quality Data Associated with the Providers?: No  Discharge Location  Discharge Placement: Home     DHRUV DwyerN  [facility]  [phone], Pager [phone]  [email or web address]

## 2020-10-02 NOTE — PROGRESS NOTES
Problem: Infection - Risk of, Urinary Catheter-Associated Urinary Tract Infection  Goal: *Absence of infection signs and symptoms  Outcome: Progressing Towards Goal     Problem: Patient Education: Go to Patient Education Activity  Goal: Patient/Family Education  Outcome: Progressing Towards Goal     Problem: Falls - Risk of  Goal: *Absence of Falls  Description: Document Alana Fall Risk and appropriate interventions in the flowsheet. Outcome: Progressing Towards Goal  Note: Fall Risk Interventions:  Mobility Interventions: Bed/chair exit alarm, Communicate number of staff needed for ambulation/transfer, Patient to call before getting OOB         Medication Interventions: Bed/chair exit alarm, Teach patient to arise slowly, Patient to call before getting OOB, Evaluate medications/consider consulting pharmacy    Elimination Interventions: Call light in reach, Bed/chair exit alarm, Patient to call for help with toileting needs, Stay With Me (per policy), Toilet paper/wipes in reach, Toileting schedule/hourly rounds              Problem: Patient Education: Go to Patient Education Activity  Goal: Patient/Family Education  Outcome: Progressing Towards Goal     Problem: Pressure Injury - Risk of  Goal: *Prevention of pressure injury  Description: Document Wily Scale and appropriate interventions in the flowsheet. Outcome: Progressing Towards Goal  Note: Pressure Injury Interventions:             Activity Interventions: Pressure redistribution bed/mattress(bed type)    Mobility Interventions: Pressure redistribution bed/mattress (bed type), HOB 30 degrees or less    Nutrition Interventions: Document food/fluid/supplement intake    Friction and Shear Interventions: HOB 30 degrees or less                Problem: Patient Education: Go to Patient Education Activity  Goal: Patient/Family Education  Outcome: Progressing Towards Goal     Problem: Pain  Goal: *Control of Pain  Outcome: Progressing Towards Goal     Problem: Patient Education: Go to Patient Education Activity  Goal: Patient/Family Education  Outcome: Progressing Towards Goal

## 2020-10-02 NOTE — PROGRESS NOTES
RENAL PROGRESS NOTE        Ga Sherburn         Assessment/Plan:   · SOLITARIO (volume depletion in a setting of resp infection/uti/severe anemia). Improving, reduce ivf. · Hypokalemia. Replaced. · HTN. Substitute amlodipine for cardizem. · Resp infection, covid is being r/o. · Complicated uti in a setting of chronic suprapubic catheter. On abx. BC pos for coag neg staph. Contaminant? Repeat bc ngtd. · Severe anemia. Etio? No overt bleeding. H/H is up with transfusion. W/u per primary team.   · Will f/u on Monday, please call if any questions. Subjective:  Patient complaints off: Feels better. No SOB/CP/N/V. Appetite is still poor.        Patient Active Problem List   Diagnosis Code    Neurogenic bladder N31.9    Recurrent UTI N39.0    Stricture, urethra N35.919    Bladder stones N21.0    Colitis, nonspecific K52.9    Rectal bleeding K62.5    Stage 3 acute kidney injury ([address] N17.9    UTI (urinary tract infection) N39.0    Symptomatic anemia D64.9    Elevated d-dimer R79.89    Thrombocytosis (HCC) D47.3    CKD (chronic kidney disease) N18.9    Abnormal antibody titer R76.0       Current Facility-Administered Medications   Medication Dose Route Frequency Provider Last Rate Last Dose    0.9% sodium chloride infusion 250 mL  250 mL IntraVENous PRN Jo-Ann Ovalle DO        VANCOMYCIN INFORMATION NOTE 1 Each  1 Each Other Rx Dosing/Monitoring Rodrigo Ovalle DO        [START ON 10/3/2020] vancomycin (VANCOCIN) 1,250 mg in 0.9% sodium chloride 250 mL IVPB  1,250 mg IntraVENous Q48H Malcom Herbert MD        aspirin chewable tablet 81 mg  81 mg Oral DAILY Rodrigo Ovalle DO   81 mg at 10/02/20 0844    dilTIAZem ER (CARDIZEM CD) capsule 240 mg  240 mg Oral DAILY Rodrigo Ovalle DO   240 mg at 10/02/20 0845    hydrALAZINE (APRESOLINE) tablet 50 mg  50 mg Oral TID Dulcy Pica E, DO   50 mg at 10/02/20 0844    montelukast (SINGULAIR) tablet 10 mg  10 mg Oral QHS Hadley Zuluaga, DO   10 mg at 10/01/20 2145    therapeutic multivitamin [facility]) tablet 1 Tab  1 Tab Oral DAILY Dulcy Pica E, DO   1 Tab at 10/02/20 0845    sodium chloride (NS) flush 5-40 mL  5-40 mL IntraVENous Q8H Rodrigo Ovalle DO   Stopped at 10/02/20 0600    sodium chloride (NS) flush 5-40 mL  5-40 mL IntraVENous PRN Adebayo Lao, DO        ondansetron [facility]) injection 4 mg  4 mg IntraVENous Q6H PRN Adebayo Lao, DO        acetaminophen (TYLENOL) tablet 650 mg  650 mg Oral Q6H PRN Adebayo Lao, DO   650 mg at 10/01/20 2145    melatonin tablet 12 mg  12 mg Oral QHS PRN Padmini Pica E, DO   12 mg at 10/02/20 0149    docusate sodium (COLACE) capsule 100 mg  100 mg Oral BID PRN Adebayo Lao, DO        albuterol-ipratropium (DUO-NEB) 2.5 MG-0.5 MG/3 ML  3 mL Nebulization Q6H PRN Adebayo Lao, DO        azithromycin (ZITHROMAX) 500 mg in 0.9% sodium chloride 250 mL IVPB  500 mg IntraVENous Q24H Rodrigo Ovalle  mL/hr at 10/01/20 1511 500 mg at 10/01/20 1511    pantoprazole (PROTONIX) tablet 40 mg  40 mg Oral DAILY PRN Sally Sawyer MD        0.9% sodium chloride with KCl 20 mEq/L infusion   IntraVENous CONTINUOUS Chely Trinidad MD 75 mL/hr at 10/02/20 1344      0.9% sodium chloride infusion 250 mL  250 mL IntraVENous PRN Adebayo Lao, DO        cefTRIAXone (ROCEPHIN) 1 g in 0.9% sodium chloride (MBP/ADV) 50 mL MBP  1 g IntraVENous Q24H Rodrigo Ovalle  mL/hr at 10/01/20 2117 1 g at 10/01/20 2117       Objective  Vitals:    10/02/20 0728 10/02/20 0737 10/02/20 0800 10/02/20 1113   BP: (!) 156/86 (!) 195/93  (!) 155/77   Pulse: 92 74  100   Resp: 18 18  18   Temp: 98.6 °F (37 °C) 97.1 °F (36.2 °C)  98.1 °F (36.7 °C)   SpO2: 95% 94% 94% 96%   Weight:       Height:             Intake/Output Summary (Last 24 hours) at 10/2/2020 1529  Last data filed at 10/2/2020 0659  Gross per 24 hour   Intake 1947 ml   Output 2665 ml   Net -718 ml           Admission weight: Weight: 72.1 kg (159 lb) (09/30/20 0800)  Last Weight Metrics:  Weight Loss Metrics 9/30/2020 9/24/2020 5/28/2020 2/26/2020 1/20/2020 2/19/2019 2/6/2019   Today's Wt 159 lb 153 lb 160 lb 170 lb 170 lb 174 lb 174 lb   BMI 28.17 kg/m2 27.1 kg/m2 28.34 kg/m2 30.11 kg/m2 30.11 kg/m2 30.82 kg/m2 30.82 kg/m2             Physical Assessment:     General: NAD, alert and oriented. Neck: No jvd. LUNGS: Clear to Auscultation, No rales, rhonchi or wheezes. CVS EXM: S1, S2  RRR. Abdomen: soft, non tender. Suprapubic catheter in place. Lower Extremities:  no edema. Lab    CBC w/Diff Recent Labs     10/02/20  0345 10/01/20  1715 10/01/20  0400  09/30/20  0400  09/29/20  1755   WBC 9.6  --  9.0  --  8.8  --  10.0   RBC 3.05*  --  2.46*  --  2.20*  --  2.61*   HGB 8.1* 8.3* 6.5*   < > 5.8*   < > 6.9*   HCT 26.2* 25.7* 21.0*   < > 18.2*   < > 21.9*     --  390  --  395  --  458*   GRANS  --   --   --   --  80*  --  76*   LYMPH  --   --   --   --  15*  --  18*   EOS  --   --   --   --  0  --  0    < > = values in this interval not displayed. Chemistry Recent Labs     10/02/20  0345 10/01/20  1025 10/01/20  0400  09/30/20  0400 09/29/20  2040   GLU 85  --  86  86  --  95 95     --  141  141  --  139 136   K 3.9 4.2 3.9  3.9   < > 3.2* 3.4*     --  110  110  --  106 103   CO2 23  --  25  24  --  25 22   BUN 26*  --  37*  36*  --  47* 49*   CREA 1.85*  --  2.29*  2.29*  --  2.89* 3.18*   CA 7.8*  --  7.2*  7.3*  --  7.6* 8.0*   AGAP 7  --  6  7  --  8 11   BUCR 14  --  16  16  --  16 15   AP  --   --   --   --  65 66   TP  --   --   --   --  6.4 6.7   ALB 2.3*  --  2.1*  --  2.2* 2.3*   GLOB  --   --   --   --  4.2* 4.4*   AGRAT  --   --   --   --  0.5* 0.5*   PHOS 3.7  --  3.7   < >  --   --     < > = values in this interval not displayed.          Lab Results   Component Value Date/Time    Iron 58 09/27/2010 12:37 PM    Ferritin 584 (H) 09/27/2010 12:37 PM      Lab Results   Component Value Date/Time    Calcium 7.8 (L) 10/02/2020 03:45 AM    Phosphorus 3.7 10/02/2020 03:45 AM        Orlando Vasques M.D.   [facility]  Phone

## 2020-10-02 NOTE — PROGRESS NOTES
OCCUPATIONAL THERAPY EVALUATION/DISCHARGE    Patient: Rosemary Adair (55 y.o. female)  Date: 10/2/2020  Primary Diagnosis: Stage 3 acute kidney injury ([facility] [address] [N17.9]        Precautions:  Fall  PLOF:  Mod I with ADLs and functional mobility w/o AD, managed suprapubic catheter independently    ASSESSMENT AND RECOMMENDATIONS:  Nursing/RN cleared pt to participate in OT tx. Patient reports fatigue d/t poor sleep past 3 days, A & O x 4, no c/o pain. Vitals supine: /71, , O2 @ RA  96%. Bed mobility: Mod I supine <-> sit edge of bed. BUE AROM: WFL, RUE with mild impaired GM d/t bell's palsy, MMT 3+/5 throughout. LB dress: Mod I sitting edge of bed using crossing leg method w/ good dynamic balance. Toilet transfer: Mod I w/o AD and additional time, assist provided with IV pole mgmt. Patient reports she is at baseline as PLOF with ADLs and functional transfers. Patient verbalized understanding to request OT if a functional decline is noted for Re-evaluation as needed. Call bell within reach & pt verbalized understanding and provided return demonstration to utilize for assist with IV pole e.g. functional transfers in order to prevent falls. Skilled occupational therapy is not indicated at this time. Discharge Recommendations: Home Health for home safety assessment  Further Equipment Recommendations for Discharge: shower chair, grab bars     SUBJECTIVE:   Patient stated I'm going back to work when I get out of here.     OBJECTIVE DATA SUMMARY:     Past Medical History:   Diagnosis Date    Abdominal wall mass     (Cancer?) S/P Excision    Baker's cyst of knee, left     Bell palsy     Benign hypertensive CKD, stage 1-4 or unspecified chronic kidney disease     CKD Stage II-IIIA    Cancer (HCC)     abdominal    Cylindrical bronchiectasis (HCC)     Predominantly Lower lobe(s)    Grade I diastolic dysfunction 86/46/7992    By TTE    Hiatal hernia     Small    History of GI bleed 06/16/2017    Hypertension Kidney stones     Left lower lobe pulmonary nodule     Subpleural    Menopause     Neurogenic bladder     Osteopenia 06/08/2016    By DEXA    Recurrent UTI     Suprapubic catheter (HCC)     2/2 Urinary Retention, Neurogenic Bladder, and Urethral Stricture    Ureteral stricture     Urinary retention      Past Surgical History:   Procedure Laterality Date    ABDOMEN SURGERY PROC UNLISTED      Wide local excison of abdominal wall mass 7/5/2016    COLONOSCOPY N/A 7/28/2017    COLONOSCOPY performed by Brian Farfan MD at [facility] ENDOSCOPY    HX APPENDECTOMY      HX COLONOSCOPY      HX COLOSTOMY  2008    HX HYSTERECTOMY  1980's    fibroids    HX UROLOGICAL  2012    suprapubic tube placement     Barriers to Learning/Limitations: None  Compensate with: visual, verbal, tactile, kinesthetic cues/model    Home Situation:   Home Situation  Home Environment: Private residence  # Steps to Enter: 0  One/Two Story Residence: One story  Living Alone: Yes  Support Systems: Child(arpit), Family member(s)  Patient Expects to be Discharged to[de-identified] Private residence  Current DME Used/Available at Home: None  Tub or Shower Type: Tub/Shower combination  [x]     Right hand dominant   []     Left hand dominant    Cognitive/Behavioral Status:  Neurologic State: Alert  Orientation Level: Oriented X4  Cognition: Follows commands; Appropriate for age attention/concentration  Safety/Judgement: Fall prevention    Skin: appears intact    Edema: none noted    Vision/Perceptual:       Corrective Lenses: Glasses    Coordination: BUE  Coordination: Within functional limits(RUE, LUE WFL)  Fine Motor Skills-Upper: Left Intact; Right Intact    Gross Motor Skills-Upper: Left Intact; Right Intact(RUE mildly impaired d/t bell's palsy)    Balance:  Sitting: Intact  Sitting - Static: Good (unsupported)  Sitting - Dynamic: Good (unsupported)  Standing: Intact; Without support  Standing - Static: Good  Standing - Dynamic : Good    Strength: BUE  Strength: Generally decreased, functional(BUEs)     Tone & Sensation: BUE  Tone: Normal(BUEs)  Sensation: Intact(BUEs)    Range of Motion: BUE  AROM: Generally decreased, functional(RUE, LUE WFL)        Functional Mobility and Transfers for ADLs:  Bed Mobility:     Supine to Sit: Modified independent  Sit to Supine: Modified independent  Scooting: Modified independent  Transfers:  Sit to Stand: Modified independent  Stand to Sit: Modified independent     Toilet Transfer : Modified independent     ADL Assessment:  Feeding: Modified independent    Oral Facial Hygiene/Grooming: Modified Independent    Bathing: Modified independent    Upper Body Dressing: Modified independent    Lower Body Dressing: Modified independent    Toileting: Modified independent     ADL Intervention: LB dress: Mod I sitting edge of bed using crossing leg method w/ good dynamic balance. Toilet transfer: Mod I w/o AD and additional time, assist provided with IV pole mgmt. Cognitive Retraining  Safety/Judgement: Fall prevention  Pain:  Pain level pre-treatment: 0/10   Pain level post-treatment: 0/10   Pain Intervention(s): Medication (see MAR); Rest, Ice, Repositioning   Response to intervention: Nurse notified, See doc flow    Activity Tolerance:   fair  Please refer to the flowsheet for vital signs taken during this treatment. After treatment:   []  Patient left in no apparent distress sitting up in chair  [x]  Patient left in no apparent distress in bed  [x]  Call bell left within reach  [x]  Nursing notified  []  Caregiver present  []  Bed alarm activated    COMMUNICATION/EDUCATION:   [x]      Role of Occupational Therapy in the acute care setting  [x]      Home safety education was provided and the patient/caregiver indicated understanding. [x]      Patient/family have participated as able and agree with findings and recommendations. []      Patient is unable to participate in plan of care at this time.     Thank you for this referral.  Milan Olszewski Time Calculation: 18 mins      Eval Complexity: History: MEDIUM Complexity : Expanded review of history including physical, cognitive and psychosocial  history ; Examination: MEDIUM Complexity : 3-5 performance deficits relating to physical, cognitive , or psychosocial skils that result in activity limitations and / or participation restrictions; Decision Making:MEDIUM Complexity : Patient may present with comorbidities that affect occupational performnce.  Miniml to moderate modification of tasks or assistance (eg, physical or verbal ) with assesment(s) is necessary to enable patient to complete evaluation

## 2020-10-02 NOTE — PROGRESS NOTES
Bedside shift change report given to this RN (oncoming nurse) by Anika Olea RN (offgoing nurse). Report included the following information SBAR, Kardex and Cardiac Rhythm SR.     1110 Per telemetry monitor tech, the patient had 3.4 second SVT with a rate of 150. Upon assessment, the patient denies any C/O pain.

## 2020-10-02 NOTE — PROGRESS NOTES
Physician Progress Note      PATIENTClaudetta Danes  CSN #:                  [id number]  :                       1945  ADMIT DATE:       2020 5:42 PM  [address] [place] DATE:  RESPONDING  PROVIDER #:        Rosales Olivo MD          QUERY TEXT:    Pt admitted with UTI. Pt noted to have a suprapubic catheter. If possible, please document in the progress notes and discharge summary if you are evaluating and/or treating any of the following: The medical record reflects the following:  Risk Factors: 76 yo female admitted with complicated UTI    Clinical Indicators: Per H&P  Patient reports that her suprapubic catheter is changed every 3 weeks and was changed last week    Treatment: UA, blood and urine culture, IV antibiotics, IV fluids      Thank your time,  Roni Valera RN, CDI  [phone]  Options provided:  -- UTI due to suprapubic  catheter  -- UTI not due to suprapubic  catheter  -- Other - I will add my own diagnosis  -- Disagree - Not applicable / Not valid  -- Disagree - Clinically unable to determine / Unknown  -- Refer to Clinical Documentation Reviewer    PROVIDER RESPONSE TEXT:    UTI is due to the suprapubic catheter. Query created by: Kameron Salgado on 10/1/2020 4:51 PM      QUERY TEXT:    Pt admitted with Complicated UTI. Pt noted to have elevated temp 100.3, , RR 28, bun 50 cr.3.39 on admission. If possible, please document in the progress notes and discharge summary if you are evaluating and /or treating any of the following: The medical record reflects the following:  Risk Factors: 76 yo female admitted with complicated UTI    Clinical Indicators:  On admission Temp 100.3, , RR 28, bun 50, cr. 3.39, Urine + for UTI  => Urine and blood cultures with gram + cocci    Treatment: IV antibiotics, IV fluids, serial labs, blood and urine cultures      Thank your time,  Roni Valera RN, CDI  [phone]  Options provided:  -- Sepsis, present on admission  -- Sepsis, not present on admission,  -- No Sepsis, UTI only  -- Sepsis was ruled out  -- Other - I will add my own diagnosis  -- Disagree - Not applicable / Not valid  -- Disagree - Clinically unable to determine / Unknown  -- Refer to Clinical Documentation Reviewer    PROVIDER RESPONSE TEXT:    This patient has sepsis which was present on admission. Query created by: Lulu Domingo on 10/1/2020 4:57 PM      QUERY TEXT:    Pt admitted with  UTI and has anemia documented. If possible, please document in progress notes and discharge summary further specificity regarding the acuity and type of anemia:    The medical record reflects the following:  Risk Factors: 76 yo female with history of anemia, CKD    Clinical Indicators: h/h as follows:  9/29   6.9/21.9   6.1/19.4  9/30   5.8/18.2   [de-identified]  10/1    6.5/21.0   8.3/25.7  10/2    8.1/26.2    Treatment: Transfusion of PRBC';s, IV fluids, serial  labs        Thank your time,  Priti Russell RN, [facility]  [phone]  Options provided:  -- Anemia due to acute blood loss  -- Anemia due to chronic blood loss  -- Anemia due to iron deficiency  -- Anemia due to chronic disease  -- Dilutional anemia  -- Other - I will add my own diagnosis  -- Disagree - Not applicable / Not valid  -- Disagree - Clinically unable to determine / Unknown  -- Refer to Clinical Documentation Reviewer    PROVIDER RESPONSE TEXT:    This patient has iron deficiency anemia.     Query created by: Lulu Domingo on 10/2/2020 12:37 PM      Electronically signed by:  Gracia Sinclair MD 10/2/2020 1:42 PM

## 2020-10-02 NOTE — PROGRESS NOTES
Problem: Mobility Impaired (Adult and Pediatric)  Goal: *Acute Goals and Plan of Care (Insert Text)  Outcome: Resolved/Met   PHYSICAL THERAPY EVALUATION AND DISCHARGE    Patient: Gustavo Sanchez (51 y.o. female)  Date: 10/2/2020  Primary Diagnosis: Stage 3 acute kidney injury ([facility] [address] [N17.9]  Precautions: Fall  PLOF: Independent  ASSESSMENT :  Reports previously amb in room and performed ADLs in bathroom. Mod I for supine to sit and sit to stand. Amb 120ft with no AD and steady gait. Self paces d/t shortness of breath and increased HR. Returned to seated EOB with good safety awareness. Mod I for sit to supine. Declines chair. HOB elevated in bed. Denies mobility concerns with discharge to home. Call bell in reach. Skilled physical therapy is not indicated at this time. PLAN :  Discharge Recommendations: None  Further Equipment Recommendations for Discharge: None     SUBJECTIVE:   Patient stated Why did they send you in here?     OBJECTIVE DATA SUMMARY:     Past Medical History:   Diagnosis Date    Abdominal wall mass     (Cancer?) S/P Excision    Baker's cyst of knee, left     Bell palsy     Benign hypertensive CKD, stage 1-4 or unspecified chronic kidney disease     CKD Stage II-IIIA    Cancer (HCC)     abdominal    Cylindrical bronchiectasis (HCC)     Predominantly Lower lobe(s)    Grade I diastolic dysfunction 04/14/8094    By TTE    Hiatal hernia     Small    History of GI bleed 06/16/2017    Hypertension     Kidney stones     Left lower lobe pulmonary nodule     Subpleural    Menopause     Neurogenic bladder     Osteopenia 06/08/2016    By DEXA    Recurrent UTI     Suprapubic catheter ([facility] [address]     2/2 Urinary Retention, Neurogenic Bladder, and Urethral Stricture    Ureteral stricture     Urinary retention      Past Surgical History:   Procedure Laterality Date    ABDOMEN SURGERY PROC UNLISTED      Wide local excison of abdominal wall mass 7/5/2016    COLONOSCOPY N/A 7/28/2017 COL[facility]COPY performed by Jeffery Park [facility] at [facility]COPY    HX APPENDECTOMY      HX COLONOSCOPY      HX COLOSTOMY  2008    HX HYSTERECTOMY  1980's    fibroids    HX UROLOGICAL  2012    suprapubic tube placement     Barriers to Learning/Limitations: None  Compensate with: Visual Cues, Verbal Cues, Tactile Cues and Kinesthetic Cues  Home Situation:   Home Situation  Home Environment: Private residence  # Steps to Enter: 0  One/Two Story Residence: One story  Living Alone: Yes  Support Systems: Child(arpit), Family member(s)  Patient Expects to be Discharged to[de-identified] Private residence  Current DME Used/Available at Home: None  Tub or Shower Type: Tub/Shower combination  Critical Behavior:  Neurologic State: Alert  Orientation Level: Oriented X4  Cognition: Follows commands; Appropriate for age attention/concentration  Safety/Judgement: Fall prevention  Psychosocial  Patient Behaviors: Calm; Cooperative  Purposeful Interaction: Yes    Strength:    Manual Muscle Testing (LE)         R     L    Hip Flexion:   4+/5  4+/5  Knee EXT:   4+/5  4+/5  Knee FLEX:   4+/5  4+/5  Ankle DF:   4+/5  4+/5  _________________________________________________   Range Of Motion:  BLE AROM WFL  Functional Mobility:  Bed Mobility:  Supine to Sit: Modified independent  Sit to Supine: Modified independent  Scooting: Modified independent  Transfers:  Sit to Stand: Modified independent  Stand to Sit: Modified independent  Balance:   Sitting: Intact  Sitting - Static: Good (unsupported)  Sitting - Dynamic: Good (unsupported)  Standing: Impaired  Standing - Static: Good  Standing - Dynamic : Good  Ambulation/Gait Training:  Distance (ft): 120 Feet (ft)   Ambulation - Level of Assistance: Independent    Pain:  Pain level pre-treatment: 0/10   Pain level post-treatment: 0/10    Activity Tolerance:   Good    After treatment:   []         Patient left in no apparent distress sitting up in chair  [x]         Patient left in no apparent distress in bed  [x]         Call bell left within reach  [x]         Nursing notified  []         Caregiver present  []         Bed alarm activated  []         SCDs applied    COMMUNICATION/EDUCATION:   [x]         Role of physical therapy in the acute care setting. [x]         Fall prevention education was provided and the patient/caregiver indicated understanding. [x]         Patient/family have participated as able in goal setting and plan of care. [x]         Patient/family agree to work toward stated goals and plan of care. []         Patient understands intent and goals of therapy, but is neutral about his/her participation. []         Patient is unable to participate in goal setting/plan of care: ongoing with therapy staff.     Thank you for this referral.  Joshua Kirby, PT   Time Calculation: 16 mins    Eval Complexity: History: MEDIUM  Complexity : 1-2 comorbidities / personal factors will impact the outcome/ POC Exam:MEDIUM Complexity : 3 Standardized tests and measures addressing body structure, function, activity limitation and / or participation in recreation  Presentation: MEDIUM Complexity : Evolving with changing characteristics  Clinical Decision Making:Medium Complexity  Clinical judgement; ROM, MMT, functional mobility Overall Complexity:MEDIUM

## 2020-10-02 NOTE — PROGRESS NOTES
Progress Note      Patient: Gianna Benites               Sex: female          DOA: 9/29/2020       YOB: 1945      Age:  75 y.o.        LOS:  LOS: 3 days             CHIEF COMPLAINT:  Sepsis, Bloodstream infection, UTI, Neurogenic bladder    Subjective:     Patient is awake and alert  Talking clearly  No distress    Objective:      Visit Vitals  BP (!) 160/70 (BP 1 Location: Left arm, BP Patient Position: At rest)   Pulse 97   Temp 97.6 °F (36.4 °C)   Resp 18   Ht 5' 3\" (1.6 m)   Wt 72.1 kg (159 lb)   SpO2 97%   Breastfeeding No   BMI 28.17 kg/m²       Physical Exam:  Gen:  Patient is in no distress. No complaint  HEENT and Neck:  PERRL, No cervical tenderness or masses  Lungs:  Clear bilaterally. No rales, no wheeze. Normal effort. Heart:  Regular Rate and Rhythm. No murmur or gallop. No JVD. No edema. Abdomen:  Soft, non tender, no masses, no Hepatosplenomegally  Extremities:  No digital clubbing, no cyanosis  Neuro:  Alert and oriented, Normal affect, Cranial nerves intact, No sensory deficits        Lab/Data Reviewed:  BMP:   Lab Results   Component Value Date/Time     10/02/2020 03:45 AM    K 3.9 10/02/2020 03:45 AM     10/02/2020 03:45 AM    CO2 23 10/02/2020 03:45 AM    AGAP 7 10/02/2020 03:45 AM    GLU 85 10/02/2020 03:45 AM    BUN 26 (H) 10/02/2020 03:45 AM    CREA 1.85 (H) 10/02/2020 03:45 AM    GFRAA 32 (L) 10/02/2020 03:45 AM    GFRNA 27 (L) 10/02/2020 03:45 AM     CBC:   Lab Results   Component Value Date/Time    WBC 9.6 10/02/2020 03:45 AM    HGB 8.1 (L) 10/02/2020 03:45 AM    HCT 26.2 (L) 10/02/2020 03:45 AM     10/02/2020 03:45 AM           Assessment/Plan     Principal Problem:    UTI (urinary tract infection) (9/29/2020)      Overview: Patient has a Chronic Suprapubic Catheter at home.     Active Problems:    Sepsis ([address] (10/2/2020)      Neurogenic bladder (9/5/2012)      Recurrent UTI (9/5/2012)      Stage 3 acute kidney injury ([address] (9/29/2020) Overview: Possibly due to UTI, possibly due to reduced PO intake. Unclear upon       admission (9/29/2020). Symptomatic anemia (9/29/2020)      Overview: Consider Iron Deficiency Anemia. Thrombocytosis ([address] (9/29/2020)      Overview: Consider Iron Deficiency Anemia. CKD (chronic kidney disease) (9/30/2020)      Overview: Stage II-IIIA? Abnormal antibody titer (9/30/2020)      Overview: Patient reportedly has developed one or more Abnormal Antibodies, which       was detected in her Type & Screen. Additional compatibility testing will       be required for Blood Transfusion. Plan:  Continue with antibiotics  Repeat blood cultures sent. Initial positive cultures do not have ID and Sens back  Continue supra pubic catheter for neurogenic  Bladder  Follow CBC  Follow renal function  DVT prophylaxis. -continue motrin 5mL every 6 hrs for fever  -you can give tylenol 5mL every 6 hrs for fever in addition to the motrin  if needed  -drink plenty of fluids    Upper Respiratory Infection in Children    WHAT YOU NEED TO KNOW:    An upper respiratory infection is also called a cold. It can affect your child's nose, throat, ears, and sinuses. The common cold is usually not serious and does not need special treatment. A cold is caused by a virus and will not get better with antibiotics. Most children get about 5 to 8 colds each year. Your child's cold symptoms will be worst for the first 3 to 5 days. His or her cold should be gone in 7 to 14 days. Your child may continue to cough for 2 to 3 weeks.    DISCHARGE INSTRUCTIONS:    Return to the emergency department if:     Your child's temperature reaches 105°F (40.6°C).      Your child has trouble breathing or is breathing faster than usual.       Your child's lips or nails turn blue.       Your child's nostrils flare when he or she takes a breath.       The skin above or below your child's ribs is sucked in with each breath.       Your child's heart is beating much faster than usual.       You see pinpoint or larger reddish-purple dots on your child's skin.       Your child stops urinating or urinates less than usual.       Your baby's soft spot on his or her head is bulging outward or sunken inward.       Your child has a severe headache or stiff neck.       Your child has chest or stomach pain.       Your baby is too weak to eat.     Contact your child's healthcare provider if:     Your child has a rectal, ear, or forehead temperature higher than 100.4°F (38°C).       Your child has an oral or pacifier temperature higher than 100°F (37.8°C).      Your child has an armpit temperature higher than 99°F (37.2°C).      Your child is younger than 2 years and has a fever for more than 24 hours.       Your child is 2 years or older and has a fever for more than 72 hours.       Your child has had thick nasal drainage for more than 2 days.       Your child has ear pain.       Your child has white spots on his or her tonsils.       Your child coughs up a lot of thick, yellow, or green mucus.       Your child is unable to eat, has nausea, or is vomiting.       Your child has increased tiredness and weakness.      Your child's symptoms do not improve or get worse within 3 days.       You have questions or concerns about your child's condition or care.    Medicines: Do not give over-the-counter cough or cold medicines to children younger than 4 years. Your healthcare provider may tell you not to give these medicines to children younger than 6 years. OTC cough and cold medicines can cause side effects that may harm your child. Your child may need any of the following:     Decongestants help reduce nasal congestion in older children and help make breathing easier. If your child takes decongestant pills, they may make him or her feel restless or cause problems with sleep. Do not give your child decongestant sprays for more than a few days.       Cough suppressants help reduce coughing in older children. Ask your child's healthcare provider which type of cough medicine is best for him or her.       Acetaminophen decreases pain and fever. It is available without a doctor's order. Ask how much to give your child and how often to give it. Follow directions. Read the labels of all other medicines your child uses to see if they also contain acetaminophen, or ask your child's doctor or pharmacist. Acetaminophen can cause liver damage if not taken correctly.      NSAIDs, such as ibuprofen, help decrease swelling, pain, and fever. This medicine is available with or without a doctor's order. NSAIDs can cause stomach bleeding or kidney problems in certain people. If you take blood thinner medicine, always ask if NSAIDs are safe for you. Always read the medicine label and follow directions. Do not give these medicines to children under 6 months of age without direction from your child's healthcare provider.      Do not give aspirin to children under 18 years of age. Your child could develop Reye syndrome if he takes aspirin. Reye syndrome can cause life-threatening brain and liver damage. Check your child's medicine labels for aspirin, salicylates, or oil of wintergreen.       Give your child's medicine as directed. Contact your child's healthcare provider if you think the medicine is not working as expected. Tell him or her if your child is allergic to any medicine. Keep a current list of the medicines, vitamins, and herbs your child takes. Include the amounts, and when, how, and why they are taken. Bring the list or the medicines in their containers to follow-up visits. Carry your child's medicine list with you in case of an emergency.    Follow up with your child's healthcare provider as directed: Write down your questions so you remember to ask them during your child's visits.    Care for your child:     Have your child rest. Rest will help his or her body get better.       Give your child more liquids as directed. Liquids will help thin and loosen mucus so your child can cough it up. Liquids will also help prevent dehydration. Liquids that help prevent dehydration include water, fruit juice, and broth. Do not give your child liquids that contain caffeine. Caffeine can increase your child's risk for dehydration. Ask your child's healthcare provider how much liquid to give your child each day.       Clear mucus from your child's nose. Use a bulb syringe to remove mucus from a baby's nose. Squeeze the bulb and put the tip into one of your baby's nostrils. Gently close the other nostril with your finger. Slowly release the bulb to suck up the mucus. Empty the bulb syringe onto a tissue. Repeat the steps if needed. Do the same thing in the other nostril. Make sure your baby's nose is clear before he or she feeds or sleeps. Your child's healthcare provider may recommend you put saline drops into your baby's nose if the mucus is very thick.Proper Use of Bulb Syringe           Soothe your child's throat. If your child is 8 years or older, have him or her gargle with salt water. Make salt water by dissolving ¼ teaspoon salt in 1 cup warm water.       Soothe your child's cough. You can give honey to children older than 1 year. Give ½ teaspoon of honey to children 1 to 5 years. Give 1 teaspoon of honey to children 6 to 11 years. Give 2 teaspoons of honey to children 12 or older.      Use a cool-mist humidifier. This will add moisture to the air and help your child breathe easier. Make sure the humidifier is out of your child's reach.      Apply petroleum-based jelly around the outside of your child's nostrils. This can decrease irritation from blowing his or her nose.       Keep your child away from smoke. Do not smoke near your child. Do not let your older child smoke. Nicotine and other chemicals in cigarettes and cigars can make your child's symptoms worse. They can also cause infections such as bronchitis or pneumonia. Ask your child's healthcare provider for information if you or your child currently smoke and need help to quit. E-cigarettes or smokeless tobacco still contain nicotine. Talk to your healthcare provider before you or your child use these products.     Prevent the spread of a cold:     Keep your child away from other people during the first 3 to 5 days of his or her cold. The virus is spread most easily during this time.       Wash your hands and your child's hands often. Teach your child to cover his or her nose and mouth when he or she sneezes, coughs, and blows his or her nose. Show your child how to cough and sneeze into the crook of the elbow instead of the hands. Handwashing           Do not let your child share toys, pacifiers, or towels with others while he or she is sick.       Do not let your child share foods, eating utensils, cups, or drinks with others while he or she is sick.

## 2020-10-02 NOTE — PROGRESS NOTES
Problem: Infection - Risk of, Urinary Catheter-Associated Urinary Tract Infection  Goal: *Absence of infection signs and symptoms  Outcome: Progressing Towards Goal     Problem: Patient Education: Go to Patient Education Activity  Goal: Patient/Family Education  Outcome: Progressing Towards Goal     Problem: Falls - Risk of  Goal: *Absence of Falls  Description: Document Alana Fall Risk and appropriate interventions in the flowsheet. Outcome: Progressing Towards Goal  Note: Fall Risk Interventions:  Mobility Interventions: Bed/chair exit alarm, Communicate number of staff needed for ambulation/transfer, Patient to call before getting OOB, PT Consult for mobility concerns         Medication Interventions: Assess postural VS orthostatic hypotension, Bed/chair exit alarm, Evaluate medications/consider consulting pharmacy, Patient to call before getting OOB, Teach patient to arise slowly    Elimination Interventions: Bed/chair exit alarm, Call light in reach, Patient to call for help with toileting needs, Toilet paper/wipes in reach, Toileting schedule/hourly rounds              Problem: Patient Education: Go to Patient Education Activity  Goal: Patient/Family Education  Outcome: Progressing Towards Goal     Problem: Pressure Injury - Risk of  Goal: *Prevention of pressure injury  Description: Document Wily Scale and appropriate interventions in the flowsheet. Outcome: Progressing Towards Goal  Note: Pressure Injury Interventions:             Activity Interventions: Increase time out of bed, Pressure redistribution bed/mattress(bed type), PT/OT evaluation    Mobility Interventions: HOB 30 degrees or less, Pressure redistribution bed/mattress (bed type), PT/OT evaluation    Nutrition Interventions: Document food/fluid/supplement intake, Discuss nutritional consult with provider, Offer support with meals,snacks and hydration    Friction and Shear Interventions: HOB 30 degrees or less                Problem: Patient Education: Go to Patient Education Activity  Goal: Patient/Family Education  Outcome: Progressing Towards Goal     Problem: Nutrition Deficit  Goal: *Optimize nutritional status  Outcome: Progressing Towards Goal     Problem: Patient Education: Go to Patient Education Activity  Goal: Patient/Family Education  Outcome: Progressing Towards Goal     Problem: Discharge Planning  Goal: *Discharge to safe environment  Outcome: Progressing Towards Goal     Problem: Pain  Goal: *Control of Pain  Outcome: Progressing Towards Goal  Goal: *PALLIATIVE CARE:  Alleviation of Pain  Outcome: Progressing Towards Goal     Problem: Patient Education: Go to Patient Education Activity  Goal: Patient/Family Education  Outcome: Progressing Towards Goal     Problem: Patient Education: Go to Patient Education Activity  Goal: Patient/Family Education  Outcome: Progressing Towards Goal     Problem: Patient Education: Go to Patient Education Activity  Goal: Patient/Family Education  Outcome: Progressing Towards Goal

## 2020-10-03 LAB
ABO + RH BLD: NORMAL
ALBUMIN SERPL-MCNC: 2.1 G/DL (ref 3.4–5)
ANION GAP SERPL CALC-SCNC: 8 MMOL/L (ref 3–18)
ANTIGENS PRESENT RBC DONR: NORMAL
BACTERIA SPEC CULT: ABNORMAL
BACTERIA SPEC CULT: ABNORMAL
BLD PROD TYP BPU: NORMAL
BLOOD BANK CMNT PATIENT-IMP: NORMAL
BLOOD GROUP ANTIBODIES SERPL: NORMAL
BLOOD GROUP ANTIBODIES SERPL: NORMAL
BPU ID: NORMAL
BUN SERPL-MCNC: 22 MG/DL (ref 7–18)
BUN/CREAT SERPL: 14 (ref 12–20)
CA-I SERPL-SCNC: 1.18 MMOL/L (ref 1.12–1.32)
CALCIUM SERPL-MCNC: 7.8 MG/DL (ref 8.5–10.1)
CALLED TO:,BCALL1: NORMAL
CALLED TO:,BCALL3: NORMAL
CHLORIDE SERPL-SCNC: 112 MMOL/L (ref 100–111)
CO2 SERPL-SCNC: 22 MMOL/L (ref 21–32)
CREAT SERPL-MCNC: 1.61 MG/DL (ref 0.6–1.3)
CROSSMATCH RESULT,%XM: NORMAL
ERYTHROCYTE [DISTWIDTH] IN BLOOD BY AUTOMATED COUNT: 16.6 % (ref 11.6–14.5)
GLUCOSE SERPL-MCNC: 77 MG/DL (ref 74–99)
GRAM STN SPEC: ABNORMAL
HCT VFR BLD AUTO: 25.6 % (ref 35–45)
HGB BLD-MCNC: 8 G/DL (ref 12–16)
MAGNESIUM SERPL-MCNC: 1.7 MG/DL (ref 1.6–2.6)
MCH RBC QN AUTO: 27.2 PG (ref 24–34)
MCHC RBC AUTO-ENTMCNC: 31.3 G/DL (ref 31–37)
MCV RBC AUTO: 87.1 FL (ref 74–97)
PHOSPHATE SERPL-MCNC: 4 MG/DL (ref 2.5–4.9)
PLATELET # BLD AUTO: 403 K/UL (ref 135–420)
PMV BLD AUTO: 9.4 FL (ref 9.2–11.8)
POTASSIUM SERPL-SCNC: 3.8 MMOL/L (ref 3.5–5.5)
RBC # BLD AUTO: 2.94 M/UL (ref 4.2–5.3)
SERVICE CMNT-IMP: ABNORMAL
SERVICE CMNT-IMP: ABNORMAL
SODIUM SERPL-SCNC: 142 MMOL/L (ref 136–145)
SPECIMEN EXP DATE BLD: NORMAL
STATUS OF UNIT,%ST: NORMAL
UNIT DIVISION, %UDIV: 0
WBC # BLD AUTO: 7.5 K/UL (ref 4.6–13.2)

## 2020-10-03 PROCEDURE — 74011250637 HC RX REV CODE- 250/637: Performed by: INTERNAL MEDICINE

## 2020-10-03 PROCEDURE — 74011250636 HC RX REV CODE- 250/636: Performed by: INTERNAL MEDICINE

## 2020-10-03 PROCEDURE — 85027 COMPLETE CBC AUTOMATED: CPT

## 2020-10-03 PROCEDURE — 82330 ASSAY OF CALCIUM: CPT

## 2020-10-03 PROCEDURE — 80069 RENAL FUNCTION PANEL: CPT

## 2020-10-03 PROCEDURE — 65660000000 HC RM CCU STEPDOWN

## 2020-10-03 PROCEDURE — 74011000258 HC RX REV CODE- 258: Performed by: INTERNAL MEDICINE

## 2020-10-03 PROCEDURE — 83735 ASSAY OF MAGNESIUM: CPT

## 2020-10-03 PROCEDURE — 2709999900 HC NON-CHARGEABLE SUPPLY

## 2020-10-03 RX ADMIN — HYDRALAZINE HYDROCHLORIDE 50 MG: 50 TABLET, FILM COATED ORAL at 08:50

## 2020-10-03 RX ADMIN — HYDRALAZINE HYDROCHLORIDE 50 MG: 50 TABLET, FILM COATED ORAL at 22:41

## 2020-10-03 RX ADMIN — HYDRALAZINE HYDROCHLORIDE 50 MG: 50 TABLET, FILM COATED ORAL at 16:46

## 2020-10-03 RX ADMIN — ASPIRIN 81 MG CHEWABLE TABLET 81 MG: 81 TABLET CHEWABLE at 08:50

## 2020-10-03 RX ADMIN — THERA TABS 1 TABLET: TAB at 08:50

## 2020-10-03 RX ADMIN — Medication 10 ML: at 22:30

## 2020-10-03 RX ADMIN — AMLODIPINE BESYLATE 10 MG: 10 TABLET ORAL at 08:51

## 2020-10-03 RX ADMIN — Medication 10 ML: at 14:00

## 2020-10-03 RX ADMIN — MONTELUKAST 10 MG: 10 TABLET, FILM COATED ORAL at 22:42

## 2020-10-03 RX ADMIN — CEFTRIAXONE 1 G: 1 INJECTION, POWDER, FOR SOLUTION INTRAMUSCULAR; INTRAVENOUS at 22:43

## 2020-10-03 NOTE — PROGRESS NOTES
Problem: Infection - Risk of, Urinary Catheter-Associated Urinary Tract Infection  Goal: *Absence of infection signs and symptoms  Outcome: Progressing Towards Goal     Problem: Patient Education: Go to Patient Education Activity  Goal: Patient/Family Education  Outcome: Progressing Towards Goal     Problem: Falls - Risk of  Goal: *Absence of Falls  Description: Document Alana Fall Risk and appropriate interventions in the flowsheet. Outcome: Progressing Towards Goal  Note: Fall Risk Interventions:  Mobility Interventions: Bed/chair exit alarm, Communicate number of staff needed for ambulation/transfer, OT consult for ADLs, Patient to call before getting OOB, PT Consult for mobility concerns, PT Consult for assist device competence         Medication Interventions: Assess postural VS orthostatic hypotension, Bed/chair exit alarm, Evaluate medications/consider consulting pharmacy, Patient to call before getting OOB, Teach patient to arise slowly    Elimination Interventions: Bed/chair exit alarm, Call light in reach, Patient to call for help with toileting needs, Toilet paper/wipes in reach, Toileting schedule/hourly rounds              Problem: Patient Education: Go to Patient Education Activity  Goal: Patient/Family Education  Outcome: Progressing Towards Goal     Problem: Pressure Injury - Risk of  Goal: *Prevention of pressure injury  Description: Document Wily Scale and appropriate interventions in the flowsheet. Outcome: Progressing Towards Goal  Note: Pressure Injury Interventions:             Activity Interventions: Increase time out of bed, Pressure redistribution bed/mattress(bed type), PT/OT evaluation    Mobility Interventions: HOB 30 degrees or less, Pressure redistribution bed/mattress (bed type), PT/OT evaluation    Nutrition Interventions: Document food/fluid/supplement intake    Friction and Shear Interventions: HOB 30 degrees or less                Problem: Patient Education: Go to Patient Education Activity  Goal: Patient/Family Education  Outcome: Progressing Towards Goal     Problem: Nutrition Deficit  Goal: *Optimize nutritional status  Outcome: Progressing Towards Goal     Problem: Patient Education: Go to Patient Education Activity  Goal: Patient/Family Education  Outcome: Progressing Towards Goal     Problem: Discharge Planning  Goal: *Discharge to safe environment  Outcome: Progressing Towards Goal     Problem: Pain  Goal: *Control of Pain  Outcome: Progressing Towards Goal  Goal: *PALLIATIVE CARE:  Alleviation of Pain  Outcome: Progressing Towards Goal     Problem: Patient Education: Go to Patient Education Activity  Goal: Patient/Family Education  Outcome: Progressing Towards Goal     Problem: Patient Education: Go to Patient Education Activity  Goal: Patient/Family Education  Outcome: Progressing Towards Goal     Problem: Patient Education: Go to Patient Education Activity  Goal: Patient/Family Education  Outcome: Progressing Towards Goal

## 2020-10-03 NOTE — PROGRESS NOTES
1915 Bedside, Verbal and Written shift change report given to 1921 Sarah Ruelas (oncoming nurse) by Suhail Gonzalez (offgoing nurse). Report included the following information SBAR, Kardex, Intake/Output, MAR and Recent Results. Patient A&OX4, denies pain. Call bell in reach, will continue to monitor. 2200 PM medications administered, pt tolerated with ease, will continue to monitor. 0000 Shift reassessment, supra-pubic catheter drainage emptied from collection bag. 650cc dark yellow urine noted, will continue to monitor. 0400  Shift reassessment, pt sleeping between care, will continue to monitor. 0725 Bedside, Verbal and Written shift change report given to Suhail Gonzalez (oncoming nurse) by Freedom Knott RN (offgoing nurse). Report included the following information SBAR, Kardex, Intake/Output, MAR and Recent Results. Skin assessment completed.

## 2020-10-03 NOTE — PROGRESS NOTES
Bedside shift change report given to this RN (oncoming nurse) by Marjan Guallpa RN (offgoing nurse).  Report included the following information SBAR, Kardex and Cardiac Rhythm SR.

## 2020-10-03 NOTE — PROGRESS NOTES
Progress Note      Patient: Kenroy Perrin               Sex: female          DOA: 9/29/2020       YOB: 1945      Age:  75 y.o.        LOS:  LOS: 4 days             CHIEF COMPLAINT:  Sepsis, Bloodstream infection, UTI, Neurogenic bladder. Subjective :      Patient is awake and alert  Talking clearly  No distress , no complains  Feeling much better compare to PTA. Objective:      Visit Vitals  BP (!) 163/83 (BP 1 Location: Left arm, BP Patient Position: Sitting)   Pulse 93   Temp 98.5 °F (36.9 °C)   Resp 18   Ht 5' 3\" (1.6 m)   Wt 72.1 kg (159 lb)   SpO2 99%   Breastfeeding No   BMI 28.17 kg/m²       Physical Exam:  Gen:  Patient is in no distress. No complaint  HEENT and Neck:  PERRL, No cervical tenderness or masses  Lungs:  Clear bilaterally. No rales, no wheeze. Normal effort. Heart:  Regular Rate and Rhythm. No murmur or gallop. No JVD. No edema. Abdomen:  Soft, non tender, no masses, no Hepatosplenomegally  Extremities:  No digital clubbing, no cyanosis  Neuro:  Alert and oriented, Normal affect, Cranial nerves intact, No sensory deficits        Lab/Data Reviewed:  BMP:   Lab Results   Component Value Date/Time     10/03/2020 06:42 AM    K 3.8 10/03/2020 06:42 AM     (H) 10/03/2020 06:42 AM    CO2 22 10/03/2020 06:42 AM    AGAP 8 10/03/2020 06:42 AM    GLU 77 10/03/2020 06:42 AM    BUN 22 (H) 10/03/2020 06:42 AM    CREA 1.61 (H) 10/03/2020 06:42 AM    GFRAA 38 (L) 10/03/2020 06:42 AM    GFRNA 31 (L) 10/03/2020 06:42 AM     CBC:   Lab Results   Component Value Date/Time    WBC 7.5 10/03/2020 06:42 AM    HGB 8.0 (L) 10/03/2020 06:42 AM    HCT 25.6 (L) 10/03/2020 06:42 AM     10/03/2020 06:42 AM           Assessment/Plan     Principal Problem:    UTI (urinary tract infection) (9/29/2020)      Overview: Patient has a Chronic Suprapubic Catheter at home.     Active Problems:    Sepsis ([facility] [address] (10/2/2020)      Neurogenic bladder (9/5/2012)      Recurrent UTI (9/5/2012)      Stage 3 acute kidney injury ([facility] [address] (9/29/2020)      Overview: Possibly due to UTI, possibly due to reduced PO intake. Unclear upon       admission (9/29/2020). Symptomatic anemia (9/29/2020)      Overview: Consider Iron Deficiency Anemia. Thrombocytosis ([address] (9/29/2020)      Overview: Consider Iron Deficiency Anemia. CKD (chronic kidney disease) (9/30/2020)      Overview: Stage II-IIIA? Abnormal antibody titer (9/30/2020)      Overview: Patient reportedly has developed one or more Abnormal Antibodies, which       was detected in her Type & Screen. Additional compatibility testing will       be required for Blood Transfusion. Plan:  D/C antibiotics and observe off Abx. Blood CS continue to be negative. Urine CS 50 K mixed rachna   Repeat blood cultures sent. Initial positive cultures do not have ID and Sens back  Continue supra pubic catheter for neurogenic  Bladder , following with urologist.   Follow CBC  Follow renal function , continue to improve   DVT prophylaxis.

## 2020-10-03 NOTE — PROGRESS NOTES
Problem: Infection - Risk of, Urinary Catheter-Associated Urinary Tract Infection  Goal: *Absence of infection signs and symptoms  Outcome: Progressing Towards Goal     Problem: Patient Education: Go to Patient Education Activity  Goal: Patient/Family Education  Outcome: Progressing Towards Goal     Problem: Falls - Risk of  Goal: *Absence of Falls  Description: Document Alana Fall Risk and appropriate interventions in the flowsheet. Outcome: Progressing Towards Goal  Note: Fall Risk Interventions:  Mobility Interventions: Bed/chair exit alarm, Patient to call before getting OOB         Medication Interventions: Bed/chair exit alarm, Patient to call before getting OOB    Elimination Interventions: Bed/chair exit alarm, Call light in reach, Patient to call for help with toileting needs              Problem: Patient Education: Go to Patient Education Activity  Goal: Patient/Family Education  Outcome: Progressing Towards Goal     Problem: Pressure Injury - Risk of  Goal: *Prevention of pressure injury  Description: Document Wily Scale and appropriate interventions in the flowsheet. Outcome: Progressing Towards Goal  Note: Pressure Injury Interventions:             Activity Interventions: Pressure redistribution bed/mattress(bed type)    Mobility Interventions: HOB 30 degrees or less, Pressure redistribution bed/mattress (bed type), PT/OT evaluation    Nutrition Interventions: Document food/fluid/supplement intake    Friction and Shear Interventions: HOB 30 degrees or less                Problem: Patient Education: Go to Patient Education Activity  Goal: Patient/Family Education  Outcome: Progressing Towards Goal     Problem: Nutrition Deficit  Goal: *Optimize nutritional status  Outcome: Progressing Towards Goal     Problem: Patient Education: Go to Patient Education Activity  Goal: Patient/Family Education  Outcome: Progressing Towards Goal     Problem: Discharge Planning  Goal: *Discharge to safe environment  Outcome: Progressing Towards Goal     Problem: Pain  Goal: *Control of Pain  Outcome: Progressing Towards Goal  Goal: *PALLIATIVE CARE:  Alleviation of Pain  Outcome: Progressing Towards Goal     Problem: Patient Education: Go to Patient Education Activity  Goal: Patient/Family Education  Outcome: Progressing Towards Goal     Problem: Patient Education: Go to Patient Education Activity  Goal: Patient/Family Education  Outcome: Progressing Towards Goal     Problem: Patient Education: Go to Patient Education Activity  Goal: Patient/Family Education  Outcome: Progressing Towards Goal

## 2020-10-04 LAB
ALBUMIN SERPL-MCNC: 2.2 G/DL (ref 3.4–5)
ANION GAP SERPL CALC-SCNC: 8 MMOL/L (ref 3–18)
BUN SERPL-MCNC: 23 MG/DL (ref 7–18)
BUN/CREAT SERPL: 15 (ref 12–20)
CALCIUM SERPL-MCNC: 8.1 MG/DL (ref 8.5–10.1)
CHLORIDE SERPL-SCNC: 111 MMOL/L (ref 100–111)
CO2 SERPL-SCNC: 23 MMOL/L (ref 21–32)
CREAT SERPL-MCNC: 1.54 MG/DL (ref 0.6–1.3)
ERYTHROCYTE [DISTWIDTH] IN BLOOD BY AUTOMATED COUNT: 16.5 % (ref 11.6–14.5)
GLUCOSE SERPL-MCNC: 78 MG/DL (ref 74–99)
HCT VFR BLD AUTO: 26.3 % (ref 35–45)
HGB BLD-MCNC: 8.2 G/DL (ref 12–16)
MCH RBC QN AUTO: 27.1 PG (ref 24–34)
MCHC RBC AUTO-ENTMCNC: 31.2 G/DL (ref 31–37)
MCV RBC AUTO: 86.8 FL (ref 74–97)
PHOSPHATE SERPL-MCNC: 4.3 MG/DL (ref 2.5–4.9)
PLATELET # BLD AUTO: 415 K/UL (ref 135–420)
PMV BLD AUTO: 9.1 FL (ref 9.2–11.8)
POTASSIUM SERPL-SCNC: 3.6 MMOL/L (ref 3.5–5.5)
RBC # BLD AUTO: 3.03 M/UL (ref 4.2–5.3)
SODIUM SERPL-SCNC: 142 MMOL/L (ref 136–145)
WBC # BLD AUTO: 7 K/UL (ref 4.6–13.2)

## 2020-10-04 PROCEDURE — 74011250637 HC RX REV CODE- 250/637: Performed by: INTERNAL MEDICINE

## 2020-10-04 PROCEDURE — 85027 COMPLETE CBC AUTOMATED: CPT

## 2020-10-04 PROCEDURE — 74011250636 HC RX REV CODE- 250/636: Performed by: INTERNAL MEDICINE

## 2020-10-04 PROCEDURE — 80069 RENAL FUNCTION PANEL: CPT

## 2020-10-04 PROCEDURE — 65660000000 HC RM CCU STEPDOWN

## 2020-10-04 RX ORDER — HEPARIN SODIUM 5000 [USP'U]/ML
5000 INJECTION, SOLUTION INTRAVENOUS; SUBCUTANEOUS EVERY 8 HOURS
Status: DISCONTINUED | OUTPATIENT
Start: 2020-10-04 | End: 2020-10-05 | Stop reason: HOSPADM

## 2020-10-04 RX ORDER — MAGNESIUM SULFATE HEPTAHYDRATE 40 MG/ML
2 INJECTION, SOLUTION INTRAVENOUS ONCE
Status: COMPLETED | OUTPATIENT
Start: 2020-10-05 | End: 2020-10-05

## 2020-10-04 RX ORDER — DILTIAZEM HYDROCHLORIDE 120 MG/1
240 CAPSULE, COATED, EXTENDED RELEASE ORAL DAILY
Status: DISCONTINUED | OUTPATIENT
Start: 2020-10-04 | End: 2020-10-05 | Stop reason: HOSPADM

## 2020-10-04 RX ADMIN — POTASSIUM CHLORIDE AND SODIUM CHLORIDE: 900; 150 INJECTION, SOLUTION INTRAVENOUS at 18:42

## 2020-10-04 RX ADMIN — ASPIRIN 81 MG CHEWABLE TABLET 81 MG: 81 TABLET CHEWABLE at 09:03

## 2020-10-04 RX ADMIN — THERA TABS 1 TABLET: TAB at 09:03

## 2020-10-04 RX ADMIN — AMLODIPINE BESYLATE 10 MG: 10 TABLET ORAL at 09:03

## 2020-10-04 RX ADMIN — HEPARIN SODIUM 5000 UNITS: 5000 INJECTION INTRAVENOUS; SUBCUTANEOUS at 18:38

## 2020-10-04 RX ADMIN — DILTIAZEM HYDROCHLORIDE 240 MG: 120 CAPSULE, COATED, EXTENDED RELEASE ORAL at 12:14

## 2020-10-04 RX ADMIN — MONTELUKAST 10 MG: 10 TABLET, FILM COATED ORAL at 21:26

## 2020-10-04 RX ADMIN — HYDRALAZINE HYDROCHLORIDE 50 MG: 50 TABLET, FILM COATED ORAL at 16:54

## 2020-10-04 RX ADMIN — HYDRALAZINE HYDROCHLORIDE 50 MG: 50 TABLET, FILM COATED ORAL at 21:26

## 2020-10-04 RX ADMIN — HEPARIN SODIUM 5000 UNITS: 5000 INJECTION INTRAVENOUS; SUBCUTANEOUS at 12:16

## 2020-10-04 RX ADMIN — HYDRALAZINE HYDROCHLORIDE 50 MG: 50 TABLET, FILM COATED ORAL at 09:04

## 2020-10-04 RX ADMIN — Medication 10 ML: at 05:35

## 2020-10-04 RX ADMIN — Medication 10 ML: at 21:31

## 2020-10-04 RX ADMIN — Medication 10 ML: at 14:00

## 2020-10-04 RX ADMIN — POTASSIUM CHLORIDE AND SODIUM CHLORIDE: 900; 150 INJECTION, SOLUTION INTRAVENOUS at 00:54

## 2020-10-04 NOTE — PROGRESS NOTES
Internal Medicine Progress Note        NAME: Sanya Anders   :  1945  MRM:  [id number]    Date/Time: 10/4/2020        ASSESSMENT/PLAN:    Principal Problem:    UTI (urinary tract infection) (2020)      Overview: Patient has a Chronic Suprapubic Catheter at home. Active Problems:    Sepsis ([facility] [address] (10/2/2020)      Neurogenic bladder (2012)      Recurrent UTI (2012)      Stage 3 acute kidney injury ([address] (2020)      Overview: Possibly due to UTI, possibly due to reduced PO intake. Unclear upon       admission (2020). Symptomatic anemia (2020)      Overview: Consider Iron Deficiency Anemia. Thrombocytosis ([address] (2020)      Overview: Consider Iron Deficiency Anemia. CKD (chronic kidney disease) (2020)      Overview: Stage II-IIIA? Abnormal antibody titer (2020)      Overview: Patient reportedly has developed one or more Abnormal Antibodies, which       was detected in her Type & Screen. Additional compatibility testing will       be required for Blood Transfusion. D/C antibiotics and observe off Abx. Had some low grade temp in last 24 hours. Cultures still NTD. May dc in am if no temp and cultures continue to be Neg. Repeat UA. Urine CS 50 K mixed rachna   Repeat blood cultures sent.   Initial positive cultures do not have ID and Sens back  Continue supra pubic catheter for neurogenic  Bladder , following with urologist.   Follow CBC  Follow renal function , continue to improve   DVT prophylaxis.       Lab Review:     Recent Labs     10/04/20  0702 10/03/20  0642 10/02/20  0345   WBC 7.0 7.5 9.6   HGB 8.2* 8.0* 8.1*   HCT 26.3* 25.6* 26.2*    403 397     Recent Labs     10/04/20  0702 10/03/20  0642 10/02/20  0345    142 141   K 3.6 3.8 3.9    112* 111   CO2 23 22 23   GLU 78 77 85   BUN 23* 22* 26*   CREA 1.54* 1.61* 1.85*   CA 8.1* 7.8* 7.8*   MG  --  1.7 1.9   PHOS 4.3 4.0 3.7   ALB 2.2* 2.1* 2.3*     No results found for: GLUCPOC  No results for input(s): PH, PCO2, PO2, HCO3, FIO2 in the last 72 hours. No results for input(s): INR, INREXT in the last 72 hours. Lab Results   Component Value Date/Time    Specimen Description: URINE 08/27/2012 07:25 AM     Lab Results   Component Value Date/Time    Culture result: NO GROWTH 2 DAYS 10/02/2020 03:55 AM    Culture result: NO GROWTH 2 DAYS 10/02/2020 03:45 AM    Culture result: MIXED UROGENITAL DEEJAY ISOLATED 09/30/2020 06:00 PM     All Cardiac Markers in the last 24 hours: No results found for: CPK, CK, CKMMB, CKMB, RCK3, CKMBT, CKNDX, CKND1, FELICIA, TROPT, TROIQ, HELENE, TROPT, TNIPOC, BNP, BNPP      Intervals noted   Pt s/e @ bedside. Subjective:     Chief Complaint:        Feeling ok . Want to go home tomorrow. Poor appetite, encouraged on po intake and supplements     ROS:  No CP/SOB/N/V/D/Pains/Bleeding/ acute joint swelling/rash/itching/fever/chills. Tolerating Diet                Objective:     Vitals:  Last 24hrs VS reviewed since prior progress note. Most recent are:    Visit Vitals  BP (!) 156/89 (BP 1 Location: Left arm, BP Patient Position: At rest)   Pulse (!) 104   Temp 99.2 °F (37.3 °C)   Resp 18   Ht 5' 3\" (1.6 m)   Wt 72.1 kg (159 lb)   SpO2 97%   Breastfeeding No   BMI 28.17 kg/m²     SpO2 Readings from Last 6 Encounters:   10/04/20 97%   05/28/20 99%   07/28/17 93%   07/10/17 95%   06/16/17 98%   07/05/16 94%            Intake/Output Summary (Last 24 hours) at 10/4/2020 1039  Last data filed at 10/4/2020 0906  Gross per 24 hour   Intake 599.33 ml   Output 1650 ml   Net -1050.67 ml          Physical Exam:   Gen:  Patient is in no distress. No complaint  HEENT and Neck:  PERRL, No cervical tenderness or masses  Lungs:  Clear bilaterally. No rales, no wheeze. Normal effort. Heart:  Regular Rate and Rhythm. No murmur or gallop. No JVD. No edema.   Abdomen:  Soft, non tender, no masses, no Hepatosplenomegally  Extremities:  No digital clubbing, no cyanosis  Neuro:  Alert and oriented, Normal affect, Cranial nerves intact, No sensory deficits. Medications Reviewed: (see below)    Lab Data Reviewed: (see below)   _________________________________________________    Medications:     Current Facility-Administered Medications   Medication Dose Route Frequency    dilTIAZem ER (CARDIZEM CD) capsule 240 mg  240 mg Oral DAILY    amLODIPine (NORVASC) tablet 10 mg  10 mg Oral DAILY    0.9% sodium chloride infusion 250 mL  250 mL IntraVENous PRN    aspirin chewable tablet 81 mg  81 mg Oral DAILY    hydrALAZINE (APRESOLINE) tablet 50 mg  50 mg Oral TID    montelukast (SINGULAIR) tablet 10 mg  10 mg Oral QHS    therapeutic multivitamin (THERAGRAN) tablet 1 Tab  1 Tab Oral DAILY    sodium chloride (NS) flush 5-40 mL  5-40 mL IntraVENous Q8H    sodium chloride (NS) flush 5-40 mL  5-40 mL IntraVENous PRN    ondansetron (ZOFRAN) injection 4 mg  4 mg IntraVENous Q6H PRN    acetaminophen (TYLENOL) tablet 650 mg  650 mg Oral Q6H PRN    melatonin tablet 12 mg  12 mg Oral QHS PRN    docusate sodium (COLACE) capsule 100 mg  100 mg Oral BID PRN    albuterol-ipratropium (DUO-NEB) 2.5 MG-0.5 MG/3 ML  3 mL Nebulization Q6H PRN    pantoprazole (PROTONIX) tablet 40 mg  40 mg Oral DAILY PRN    0.9% sodium chloride with KCl 20 mEq/L infusion   IntraVENous CONTINUOUS    0.9% sodium chloride infusion 250 mL  250 mL IntraVENous PRN          Total time spent with patient: 35 minutes                  Care Plan discussed with: Patient, Care Manager, Nursing Staff and >50% of time spent in counseling and coordination of care    Discussed:  Care Plan    Prophylaxis:  Hep SQ    Disposition:  Home w/Family           This document in whole or part of it has been produced using voice recognition software. Unrecognized errors in transcription may be present.     Attending Physician: Kia Lemus MD

## 2020-10-04 NOTE — PROGRESS NOTES
Bedside shift change report given to this RN (oncoming nurse) by Raymond Fernández RN (offgoing nurse). Report included the following information SBAR and Kardex.

## 2020-10-04 NOTE — PROGRESS NOTES
- In  patient's chart to check the IVF infusing. As per the order IVF resumed infusing well. Supra pubic catheter care done, 900 ml of cloudy  Urine emptied from the urine bag. Pt requested for prayer. Prayer offered pt appreciative- primary nurse notified.

## 2020-10-04 NOTE — PROGRESS NOTES
Problem: Infection - Risk of, Urinary Catheter-Associated Urinary Tract Infection  Goal: *Absence of infection signs and symptoms  Outcome: Progressing Towards Goal     Problem: Patient Education: Go to Patient Education Activity  Goal: Patient/Family Education  Outcome: Progressing Towards Goal     Problem: Falls - Risk of  Goal: *Absence of Falls  Description: Document Alana Fall Risk and appropriate interventions in the flowsheet. Outcome: Progressing Towards Goal  Note: Fall Risk Interventions:  Mobility Interventions: Bed/chair exit alarm, Communicate number of staff needed for ambulation/transfer, OT consult for ADLs, Patient to call before getting OOB, PT Consult for mobility concerns         Medication Interventions: Assess postural VS orthostatic hypotension, Bed/chair exit alarm, Evaluate medications/consider consulting pharmacy, Patient to call before getting OOB, Teach patient to arise slowly    Elimination Interventions: Bed/chair exit alarm, Call light in reach, Patient to call for help with toileting needs, Toilet paper/wipes in reach, Toileting schedule/hourly rounds              Problem: Patient Education: Go to Patient Education Activity  Goal: Patient/Family Education  Outcome: Progressing Towards Goal     Problem: Pressure Injury - Risk of  Goal: *Prevention of pressure injury  Description: Document Wily Scale and appropriate interventions in the flowsheet. Outcome: Progressing Towards Goal  Note: Pressure Injury Interventions:             Activity Interventions: Increase time out of bed, Pressure redistribution bed/mattress(bed type), PT/OT evaluation    Mobility Interventions: HOB 30 degrees or less, Pressure redistribution bed/mattress (bed type), PT/OT evaluation    Nutrition Interventions: Document food/fluid/supplement intake, Discuss nutritional consult with provider, Offer support with meals,snacks and hydration    Friction and Shear Interventions: HOB 30 degrees or less Problem: Patient Education: Go to Patient Education Activity  Goal: Patient/Family Education  Outcome: Progressing Towards Goal     Problem: Nutrition Deficit  Goal: *Optimize nutritional status  Outcome: Progressing Towards Goal     Problem: Patient Education: Go to Patient Education Activity  Goal: Patient/Family Education  Outcome: Progressing Towards Goal     Problem: Discharge Planning  Goal: *Discharge to safe environment  Outcome: Progressing Towards Goal     Problem: Pain  Goal: *Control of Pain  Outcome: Progressing Towards Goal  Goal: *PALLIATIVE CARE:  Alleviation of Pain  Outcome: Progressing Towards Goal     Problem: Patient Education: Go to Patient Education Activity  Goal: Patient/Family Education  Outcome: Progressing Towards Goal     Problem: Patient Education: Go to Patient Education Activity  Goal: Patient/Family Education  Outcome: Progressing Towards Goal     Problem: Patient Education: Go to Patient Education Activity  Goal: Patient/Family Education  Outcome: Progressing Towards Goal

## 2020-10-04 NOTE — PROGRESS NOTES
Problem: Infection - Risk of, Urinary Catheter-Associated Urinary Tract Infection  Goal: *Absence of infection signs and symptoms  Outcome: Progressing Towards Goal     Problem: Patient Education: Go to Patient Education Activity  Goal: Patient/Family Education  Outcome: Progressing Towards Goal     Problem: Falls - Risk of  Goal: *Absence of Falls  Description: Document Alana Fall Risk and appropriate interventions in the flowsheet. Outcome: Progressing Towards Goal  Note: Fall Risk Interventions:  Mobility Interventions: Bed/chair exit alarm, Communicate number of staff needed for ambulation/transfer, OT consult for ADLs, Patient to call before getting OOB, PT Consult for mobility concerns, PT Consult for assist device competence         Medication Interventions: Assess postural VS orthostatic hypotension, Bed/chair exit alarm, Evaluate medications/consider consulting pharmacy, Patient to call before getting OOB, Teach patient to arise slowly    Elimination Interventions: Bed/chair exit alarm, Call light in reach, Patient to call for help with toileting needs, Toilet paper/wipes in reach, Toileting schedule/hourly rounds              Problem: Pressure Injury - Risk of  Goal: *Prevention of pressure injury  Description: Document Wily Scale and appropriate interventions in the flowsheet. Outcome: Progressing Towards Goal  Note: Pressure Injury Interventions:             Activity Interventions: Increase time out of bed, Pressure redistribution bed/mattress(bed type), PT/OT evaluation    Mobility Interventions: HOB 30 degrees or less, Pressure redistribution bed/mattress (bed type), PT/OT evaluation    Nutrition Interventions: Document food/fluid/supplement intake    Friction and Shear Interventions: HOB 30 degrees or less                Problem: Patient Education: Go to Patient Education Activity  Goal: Patient/Family Education  Outcome: Progressing Towards Goal     Problem: Nutrition Deficit  Goal: *Optimize nutritional status  Outcome: Progressing Towards Goal

## 2020-10-04 NOTE — PROGRESS NOTES
Assume care of patient lying in bed watching TV. Alert and oriented X 4. Denies pain or discomfort at present. Bed locked in lowest position. Call light within reach and understand to use for assistance and needs. Supra catheter intact in abdominal and draining yellow urine in bag. Patient stating several times she doesn't want to be bother so much. 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 2210 Resting quietly at present. Patient making jokes and teasing staff member. 10/04/2020    0030 Hourly rounds. 4091 Patient resting quietly at present with humor still after awaken. 5510 Resting quietly at present and complaining of being disturbed frequently during the night. 2250 Bedside and Verbal shift change report given to Kelsy Candelaria RN (oncoming nurse) by Angelica Bradford RN (offgoing nurse). Report given with SBAR, Kardex, Intake/Output, MAR and Recent Results.

## 2020-10-05 VITALS
TEMPERATURE: 99 F | HEART RATE: 100 BPM | OXYGEN SATURATION: 95 % | DIASTOLIC BLOOD PRESSURE: 75 MMHG | BODY MASS INDEX: 28.17 KG/M2 | HEIGHT: 63 IN | WEIGHT: 159 LBS | SYSTOLIC BLOOD PRESSURE: 154 MMHG | RESPIRATION RATE: 17 BRPM

## 2020-10-05 LAB
ALBUMIN SERPL-MCNC: 2.2 G/DL (ref 3.4–5)
ANION GAP SERPL CALC-SCNC: 7 MMOL/L (ref 3–18)
APPEARANCE UR: CLEAR
BACTERIA URNS QL MICRO: NEGATIVE /HPF
BASOPHILS # BLD: 0 K/UL (ref 0–0.1)
BASOPHILS NFR BLD: 1 % (ref 0–2)
BILIRUB UR QL: NEGATIVE
BUN SERPL-MCNC: 21 MG/DL (ref 7–18)
BUN/CREAT SERPL: 14 (ref 12–20)
CALCIUM SERPL-MCNC: 8 MG/DL (ref 8.5–10.1)
CHLORIDE SERPL-SCNC: 108 MMOL/L (ref 100–111)
CO2 SERPL-SCNC: 24 MMOL/L (ref 21–32)
COLOR UR: YELLOW
CREAT SERPL-MCNC: 1.51 MG/DL (ref 0.6–1.3)
DIFFERENTIAL METHOD BLD: ABNORMAL
EOSINOPHIL # BLD: 0.1 K/UL (ref 0–0.4)
EOSINOPHIL NFR BLD: 1 % (ref 0–5)
EPITH CASTS URNS QL MICRO: NORMAL /LPF (ref 0–5)
ERYTHROCYTE [DISTWIDTH] IN BLOOD BY AUTOMATED COUNT: 16.4 % (ref 11.6–14.5)
GLUCOSE SERPL-MCNC: 85 MG/DL (ref 74–99)
GLUCOSE UR STRIP.AUTO-MCNC: NEGATIVE MG/DL
GRAN CASTS URNS QL MICRO: NORMAL /LPF
HCT VFR BLD AUTO: 25.5 % (ref 35–45)
HGB BLD-MCNC: 7.9 G/DL (ref 12–16)
HGB UR QL STRIP: ABNORMAL
KETONES UR QL STRIP.AUTO: NEGATIVE MG/DL
LEUKOCYTE ESTERASE UR QL STRIP.AUTO: NEGATIVE
LYMPHOCYTES # BLD: 1.4 K/UL (ref 0.9–3.6)
LYMPHOCYTES NFR BLD: 20 % (ref 21–52)
MAGNESIUM SERPL-MCNC: 2.6 MG/DL (ref 1.6–2.6)
MCH RBC QN AUTO: 26.8 PG (ref 24–34)
MCHC RBC AUTO-ENTMCNC: 31 G/DL (ref 31–37)
MCV RBC AUTO: 86.4 FL (ref 74–97)
MONOCYTES # BLD: 0.3 K/UL (ref 0.05–1.2)
MONOCYTES NFR BLD: 5 % (ref 3–10)
NEUTS SEG # BLD: 5.1 K/UL (ref 1.8–8)
NEUTS SEG NFR BLD: 73 % (ref 40–73)
NITRITE UR QL STRIP.AUTO: NEGATIVE
PH UR STRIP: 5 [PH] (ref 5–8)
PHOSPHATE SERPL-MCNC: 4.1 MG/DL (ref 2.5–4.9)
PLATELET # BLD AUTO: 410 K/UL (ref 135–420)
PMV BLD AUTO: 9.3 FL (ref 9.2–11.8)
POTASSIUM SERPL-SCNC: 3.6 MMOL/L (ref 3.5–5.5)
PROT UR STRIP-MCNC: 100 MG/DL
RBC # BLD AUTO: 2.95 M/UL (ref 4.2–5.3)
RBC #/AREA URNS HPF: NORMAL /HPF (ref 0–5)
SODIUM SERPL-SCNC: 139 MMOL/L (ref 136–145)
SP GR UR REFRACTOMETRY: 1.01 (ref 1–1.03)
UROBILINOGEN UR QL STRIP.AUTO: 0.2 EU/DL (ref 0.2–1)
WBC # BLD AUTO: 7 K/UL (ref 4.6–13.2)
WBC URNS QL MICRO: NORMAL /HPF (ref 0–4)

## 2020-10-05 PROCEDURE — 36415 COLL VENOUS BLD VENIPUNCTURE: CPT

## 2020-10-05 PROCEDURE — 83735 ASSAY OF MAGNESIUM: CPT

## 2020-10-05 PROCEDURE — 74011250637 HC RX REV CODE- 250/637: Performed by: INTERNAL MEDICINE

## 2020-10-05 PROCEDURE — 81001 URINALYSIS AUTO W/SCOPE: CPT

## 2020-10-05 PROCEDURE — 2709999900 HC NON-CHARGEABLE SUPPLY

## 2020-10-05 PROCEDURE — 77030012865 HC BG URIN LEG MDII -A

## 2020-10-05 PROCEDURE — 87086 URINE CULTURE/COLONY COUNT: CPT

## 2020-10-05 PROCEDURE — 74011250636 HC RX REV CODE- 250/636: Performed by: INTERNAL MEDICINE

## 2020-10-05 PROCEDURE — 97164 PT RE-EVAL EST PLAN CARE: CPT

## 2020-10-05 PROCEDURE — 77030040831 HC BAG URINE DRNG MDII -A

## 2020-10-05 PROCEDURE — 80069 RENAL FUNCTION PANEL: CPT

## 2020-10-05 PROCEDURE — 85025 COMPLETE CBC W/AUTO DIFF WBC: CPT

## 2020-10-05 RX ADMIN — MAGNESIUM SULFATE HEPTAHYDRATE 2 G: 40 INJECTION, SOLUTION INTRAVENOUS at 00:01

## 2020-10-05 RX ADMIN — HEPARIN SODIUM 5000 UNITS: 5000 INJECTION INTRAVENOUS; SUBCUTANEOUS at 05:28

## 2020-10-05 RX ADMIN — HYDRALAZINE HYDROCHLORIDE 50 MG: 50 TABLET, FILM COATED ORAL at 08:55

## 2020-10-05 RX ADMIN — ASPIRIN 81 MG CHEWABLE TABLET 81 MG: 81 TABLET CHEWABLE at 08:55

## 2020-10-05 RX ADMIN — THERA TABS 1 TABLET: TAB at 08:55

## 2020-10-05 RX ADMIN — Medication 10 ML: at 05:28

## 2020-10-05 RX ADMIN — DILTIAZEM HYDROCHLORIDE 240 MG: 120 CAPSULE, COATED, EXTENDED RELEASE ORAL at 08:55

## 2020-10-05 NOTE — PROGRESS NOTES
1935 Bedside, Verbal and Written shift change report given to 1921 Sarah Ruelas (oncoming nurse) by Ignacio Haley (offgoing nurse). Report included the following information SBAR, Kardex, Intake/Output, MAR and Recent Results. Patient A&OX4, denies pain, resting in bed, non-productive cough noted. 2200 PM medications administered, pt tolerated with ease, will continue to monitor. 2300 noted patients Magnesium level 10/3 was 1.7 but no replacement received and no order for additional lab monitor. Spoke with MD Ovalle, order received for 2g magnesium sulfate IV now, recheck magnesium level in AM.     0000 Shift reassessment, pt condition unchanged, will continue to monitor. 0200 patient asleep in bed    0330 patient refusing vitals and heparin SC at this time. Stated she is sleeping and will agree to resume plan of care after 0500      0400  Shift reassessment, pt sleeping between care, will continue to monitor. 0530 patient cooperative with care. VSS      0725 Bedside, Verbal and Written shift change report given to Rick Valenzuela (oncoming nurse) by Ace Eubanks RN (offgoing nurse). Report included the following information SBAR, Kardex, Intake/Output, MAR and Recent Results. Skin assessment completed.

## 2020-10-05 NOTE — PROGRESS NOTES
8626-  Bedside and Verbal shift change report given to Alan Ames RN (oncoming nurse) by Linda Ramirez RN (offgoing nurse). Report included the following information SBAR, Kardex, Intake/Output, MAR and Recent Results.

## 2020-10-05 NOTE — DISCHARGE INSTRUCTIONS
DISCHARGE SUMMARY from Nurse    PATIENT INSTRUCTIONS:    After general anesthesia or intravenous sedation, for 24 hours or while taking prescription Narcotics:  · Limit your activities  · Do not drive and operate hazardous machinery  · Do not make important personal or business decisions  · Do  not drink alcoholic beverages  · If you have not urinated within 8 hours after discharge, please contact your surgeon on call. Report the following to your surgeon:  · Excessive pain, swelling, redness or odor of or around the surgical area  · Temperature over 100.5  · Nausea and vomiting lasting longer than 4 hours or if unable to take medications  · Any signs of decreased circulation or nerve impairment to extremity: change in color, persistent  numbness, tingling, coldness or increase pain  · Any questions    What to do at Home:  Recommended activity: Activity as tolerated    If you experience any of the following symptoms worsening shortness of breath, change in urine odor/color, fever, or chills, please follow up with primary care physician/emergency room. *  Please give a list of your current medications to your Primary Care Provider. *  Please update this list whenever your medications are discontinued, doses are      changed, or new medications (including over-the-counter products) are added. *  Please carry medication information at all times in case of emergency situations. These are general instructions for a healthy lifestyle:    No smoking/ No tobacco products/ Avoid exposure to second hand smoke  Surgeon General's Warning:  Quitting smoking now greatly reduces serious risk to your health.     Obesity, smoking, and sedentary lifestyle greatly increases your risk for illness    A healthy diet, regular physical exercise & weight monitoring are important for maintaining a healthy lifestyle    You may be retaining fluid if you have a history of heart failure or if you experience any of the following symptoms:  Weight gain of 3 pounds or more overnight or 5 pounds in a week, increased swelling in our hands or feet or shortness of breath while lying flat in bed. Please call your doctor as soon as you notice any of these symptoms; do not wait until your next office visit. The discharge information has been reviewed with the patient. The patient verbalized understanding. Discharge medications reviewed with the patient and appropriate educational materials and side effects teaching were provided. Patient armband removed and shredded. HOSPITALIST DISCHARGE INSTRUCTIONS  NAME: Gualberto Alegria   :  1945   MRN:  [id number]     Date/Time:  10/5/2020 8:35 AM    ADMIT DATE: 2020     DISCHARGE DATE: 10/5/2020     DISCHARGE DIAGNOSIS:  UTI  SOLITARIO    MEDICATIONS:     · It is important that you take the medication exactly as they are prescribed. · Keep your medication in the bottles provided by the pharmacist and keep a list of the medication names, dosages, and times to be taken in your wallet. · Do not take other medications without consulting your doctor. Pain Management: per above medications    What to do at Home    Recommended diet:  Resume previous diet    Recommended activity: Activity as tolerated    Keep log book for x3 daily temp check and reported to PCP, report earlier if fever or symptoms. If you experience any of the following symptoms then please call your primary care physician or return to the emergency room if you cannot get hold of your doctor:  Fever, chills, nausea, vomiting, diarrhea, change in mentation, falling, bleeding, shortness of breath. Any new or worsening symptoms. Or call 911 for emergency. Follow Up:  Dr. Bailey Salter MD  you are to call and set up an appointment to see them in 7 days. Information obtained by :  I understand that if any problems occur once I am at home I am to contact my physician.     I understand and acknowledge receipt of the instructions indicated above.                                                                                                                                            Physician's or R.N.'s Signature                                                                  Date/Time                                                                                                                                              Patient or Representative Signature                                                          Date/Time

## 2020-10-05 NOTE — PROGRESS NOTES
Problem: Infection - Risk of, Urinary Catheter-Associated Urinary Tract Infection  Goal: *Absence of infection signs and symptoms  Outcome: Progressing Towards Goal     Problem: Patient Education: Go to Patient Education Activity  Goal: Patient/Family Education  Outcome: Progressing Towards Goal     Problem: Falls - Risk of  Goal: *Absence of Falls  Description: Document Alana Fall Risk and appropriate interventions in the flowsheet. Outcome: Progressing Towards Goal  Note: Fall Risk Interventions:  Mobility Interventions: Bed/chair exit alarm, Communicate number of staff needed for ambulation/transfer, OT consult for ADLs, Patient to call before getting OOB, PT Consult for mobility concerns         Medication Interventions: Patient to call before getting OOB    Elimination Interventions: Call light in reach, Patient to call for help with toileting needs              Problem: Patient Education: Go to Patient Education Activity  Goal: Patient/Family Education  Outcome: Progressing Towards Goal     Problem: Pressure Injury - Risk of  Goal: *Prevention of pressure injury  Description: Document Wily Scale and appropriate interventions in the flowsheet. Outcome: Progressing Towards Goal  Note: Pressure Injury Interventions:             Activity Interventions: Pressure redistribution bed/mattress(bed type)    Mobility Interventions: HOB 30 degrees or less, Pressure redistribution bed/mattress (bed type), PT/OT evaluation    Nutrition Interventions: Document food/fluid/supplement intake    Friction and Shear Interventions: HOB 30 degrees or less                Problem: Patient Education: Go to Patient Education Activity  Goal: Patient/Family Education  Outcome: Progressing Towards Goal     Problem: Nutrition Deficit  Goal: *Optimize nutritional status  Outcome: Progressing Towards Goal     Problem: Patient Education: Go to Patient Education Activity  Goal: Patient/Family Education  Outcome: Progressing Towards Goal Problem: Discharge Planning  Goal: *Discharge to safe environment  Outcome: Progressing Towards Goal     Problem: Pain  Goal: *Control of Pain  Outcome: Progressing Towards Goal  Goal: *PALLIATIVE CARE:  Alleviation of Pain  Outcome: Progressing Towards Goal     Problem: Patient Education: Go to Patient Education Activity  Goal: Patient/Family Education  Outcome: Progressing Towards Goal     Problem: Patient Education: Go to Patient Education Activity  Goal: Patient/Family Education  Outcome: Progressing Towards Goal     Problem: Patient Education: Go to Patient Education Activity  Goal: Patient/Family Education  Outcome: Progressing Towards Goal

## 2020-10-05 NOTE — PROGRESS NOTES
[facility]       October 5, 2020       RE: Denise Pierre      To Whom It May Concern,    This is to certify that Denise Pierre was hospitalized at [facility]   from 9/29/2020 to 10/5/2020 and may may return to work on  10/07/2020. Please feel free to contact my office if you have any questions or concerns. Thank you for your assistance in this matter. Sincerely,  Reji Castro MD  [facility]   Office [phone].

## 2020-10-05 NOTE — DISCHARGE SUMMARY
Discharge Summary      [facility] service    Patient ID:  Daisy Guajardo, 75 y.o., female  : 1945    Admit Date: 2020  Discharge Date:  10/5/2020  Length of stay: 6 day(s)    PCP:  Reed Dupree MD    Chief Complaint   Patient presents with    Shortness of Breath       Discharge Diagnosis:   Hospital Problems  Date Reviewed: 2020          Codes Class Noted POA    CKD (chronic kidney disease) ICD-10-CM: N18.9  ICD-9-CM: 585.9  2020 Yes    Overview Signed 2020  1:40 AM by Stoney Mejia,      Stage II-IIIA? Abnormal antibody titer ICD-10-CM: R76.0  ICD-9-CM: 795.79  2020 Yes    Overview Signed 2020  2:27 AM by Stoney Mejia,      Patient reportedly has developed one or more Abnormal Antibodies, which was detected in her Type & Screen. Additional compatibility testing will be required for Blood Transfusion. Stage 3 acute kidney injury [facility]) ICD-10-CM: N17.9  ICD-9-CM: 584.9  2020 Yes    Overview Signed 2020 10:07 PM by Stoney Mejia,      Possibly due to UTI, possibly due to reduced PO intake. Unclear upon admission (2020). * (Principal) UTI (urinary tract infection) ICD-10-CM: N39.0  ICD-9-CM: 599.0  2020 Yes    Overview Signed 2020 10:06 PM by Stoney Mejia DO     Patient has a Chronic Suprapubic Catheter at home. Symptomatic anemia ICD-10-CM: D64.9  ICD-9-CM: 285.9  2020 Yes    Overview Signed 2020 10:06 PM by Stoney Mejia,      Consider Iron Deficiency Anemia. Thrombocytosis ([address] ICD-10-CM: D47.3  ICD-9-CM: 238.71  2020 Yes    Overview Signed 2020 10:05 PM by Stoney Mejia,      Consider Iron Deficiency Anemia.              Neurogenic bladder ICD-10-CM: N31.9  ICD-9-CM: 596.54  2012 Yes        Recurrent UTI ICD-10-CM: N39.0  ICD-9-CM: 599.0  2012 Yes              Discharge instructions:    Recommended diet:  Resume previous diet    Recommended activity: Activity as tolerated   keep log book for x3 daily temp check and reported to PCP, report earlier if fever or symptoms. # Follow-up appointments: Follow-up Information     Follow up With Specialties Details Why Contact Info    Mary Rivas MD Geriatric Medicine Schedule an appointment as soon as possible for a visit on 10/15/2020 Follow up to discuss your recent hospitalization. [address]  [address] [facility]  [phone]              HPI on Admission (per admitting physician):  Marylene Self is a 75 y.o. -American female who presents to [address] [place] [address] ER with complaint of Shortness of Breath. Patient reports shortness of breath for 2 weeks, worse today. Patient reports cough for 2 weeks worse with deep breath. Patient reports chest pains intermittently for the last 2 weeks and reports that they feel alternative like weigh and other times like a crushing sensation without radiation. Patient denies diaphoresis, admits to some shortness of breath, and denies nausea. Patient complains of early satiety, poor appetite, and poor PO intake for approximately 3 weeks and reports a 30-lb weight loss in the last 3 weeks. Patient states that she has not been keeping up with her fluids. Patient denies dysuria, urgency, and increased urinary frequency. Patient denied abdominal pain. Patient reports that her suprapubic catheter is changed every 3 weeks and was changed last week. Patient denies melena, hematochezia, and hematuria. Patient denies fevers, vomiting, and sick contacts. In [address] [place] [address] ER, Patient is noted to have Temperature 100.3°F, Herat Rate 105 bpm, Respiration Rate 28 bpm, Blood Pressure 105/85 mm Hg, WBC 10.0, Hgb 6.9, Platelet 110, Neut% 25%, Neut# 7.6,  D-Dimer 7.58, BUN 50, Creatinine 3.39 (Baseline Creatinine 1.05-1.10), eGFR 13/16, Troponin <0.02, and Lactic Acid 1.21. Patient may have received a single breathing treatment in [address] [place] [address] ER. Patient is admitted to [facility] Telemetry Unit (Covid-19 Cohort for Rule Out) for management of Complicated UTI (Suprapubic Catheter), Acute Kidney Injury Stage 3, Symptomatic Anemia, and questionable CAP versus Right Pleural Effusion. For further details and initial management please refer to H/P. Hospital Course:      Admitted with UTI, symptomatic anemia and SOLITARIO stage 3. She has chronic SP quijano cath. initially treated with triple antibiotic broad spectrum then  D/C antibiotics and observed off Abx.  received course of iv CFTX. Continued to be afebrile and no significant leukocytosis , no abdominal pains and asymptomatic. Had some low grade temp in last 24 hours. Cultures still NTD. P atient wanted to go home and when I told her she had low grade fever she stated because placing 4 blanket on and still want to go home. Heladio Barronon our ID the case and if ok with D/C,  Patient Advised to keep log book for x3 daily temp check and reported to PCP, report earlier if fever or symptoms. She has follow up with pcp on 10/15 and she normally follow with her urologist also. Urine CS 50 K mixed rachna  Repeat blood cultures sent.  Initial positive cultures do not have ID and Sens back. Continue supra pubic catheter for neurogenic  Bladder , following with urologist.   Renal function continued to improve. Hb stable. 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 - to be seen by PT prior dc for safe discharge, and after sending a repeat urine culture.      Discharge condition:  improved and stable    Disposition:  Home    Procedures:   * No surgery found *     Physical Exam on Discharge:  Visit Vitals  BP (!) 147/71   Pulse 98   Temp 99.5 °F (37.5 °C)   Resp 16   Ht 5' 3\" (1.6 m)   Wt 72.1 kg (159 lb)   SpO2 97%   Breastfeeding No   BMI 28.17 kg/m²   Gen:  Patient is in no distress.  No complaint  HEENT and Neck:  PERRL, No cervical tenderness or masses  Lungs:  Clear bilaterally.  No rales, no wheeze.  Normal effort. Heart:  Regular Rate and Rhythm.  No  gallop.  No JVD   Abdomen:  Soft, non tender, no masses, no distension  Urine is clear arian color in her quijano  Extremities:  No digital clubbing, no cyanosis  Neuro:  Alert and oriented, Normal affect, Cranial nerves intact, No sensory deficits. Hospitalization and discharge instructions d/c in details with : patient , Nursing/CM . Curb side consulted with our ID, as above. Discharge medications :  Current Discharge Medication List      CONTINUE these medications which have NOT CHANGED    Details   montelukast (SINGULAIR) 10 mg tablet Take 10 mg by mouth daily. hydrALAZINE (APRESOLINE) 50 mg tablet Take 50 mg by mouth three (3) times daily. omeprazole (PRILOSEC) 20 mg capsule Take 20 mg by mouth daily. multivitamin (ONE A DAY) tablet Take by Mouth Once a Day. diltiazem CD (CARDIZEM CD) 240 mg ER capsule Take 240 mg by mouth daily. MULTIVITS-MIN/FA/CA CARB/VIT K (ONE-A-DAY WOMEN'S 50+ PO) Take 1 Tab by mouth daily. traMADol (ULTRAM) 50 mg tablet       aspirin 81 mg tablet Take 81 mg by mouth daily. STOP taking these medications       ibuprofen (ADVIL) 200 mg tablet Comments:   Reason for Stopping:                   Most Recent Labs:       Recent Labs     10/05/20  0310 10/04/20  0702 10/03/20  0642   WBC 7.0 7.0 7.5   HGB 7.9* 8.2* 8.0*   HCT 25.5* 26.3* 25.6*    415 403     Recent Labs     10/05/20  0310 10/04/20  0702 10/03/20  0642    142 142   K 3.6 3.6 3.8    111 112*   CO2 24 23 22   GLU 85 78 77   BUN 21* 23* 22*   CREA 1.51* 1.54* 1.61*   CA 8.0* 8.1* 7.8*   MG 2.6  --  1.7   PHOS 4.1 4.3 4.0   ALB 2.2* 2.2* 2.1*     No results found for: GLUCPOC  No results for input(s): PH, PCO2, PO2, HCO3, FIO2 in the last 72 hours. No results for input(s): INR, INREXT in the last 72 hours.     Lab Results   Component Value Date/Time    Specimen Description: URINE 08/27/2012 07:25 AM     Lab Results   Component Value Date/Time    Culture result: NO GROWTH 3 DAYS 10/02/2020 03:55 AM    Culture result: NO GROWTH 3 DAYS 10/02/2020 03:45 AM    Culture result: MIXED UROGENITAL DEEJAY ISOLATED 09/30/2020 06:00 PM       All Cardiac Markers in the last 24 hours: No results found for: CPK, CK, CKMMB, CKMB, RCK3, CKMBT, CKNDX, CKND1, FELICIA, TROPT, TROIQ, HELENE, TROPT, TNIPOC, BNP, BNPP    XR Results:  Results from Hospital Encounter encounter on 09/29/20   XR CHEST PORT    Narrative EXAM: CHEST RADIOGRAPH, SINGLE VIEW    CLINICAL INDICATION/HISTORY: chest pain, sob, and/or arrhythmia    COMPARISON: Single view chest 5/28/2020    TECHNIQUE: Portable frontal view of the chest was obtained.     _______________    FINDINGS: There appears to be mild respiratory motion artifact. SUPPORT DEVICES: None. HEART AND MEDIASTINUM: Heart is at the upper limits of normal.  Pulmonary  vascularity appears normal.    LUNGS AND PLEURAL SPACES: Lungs are clear. No pleural effusion or pneumothorax. BONY THORAX AND SOFT TISSUES: No acute osseous abnormality. _______________      Impression IMPRESSION:    Study is degraded by respiratory motion artifact. Borderline heart size without  CHF. No definite infiltrate. CT Results:  Results from Hospital Encounter encounter on 09/29/20   CT ABD PELV WO CONT    Addendum Addendum:  Note: Preliminary report sent to the Emergency Department by the radiology resident at the time of the study. Wendi Alfredo MD 9/30/2020  8:16 AM          Narrative EXAM: CT ABD PELV WO CONT    CLINICAL INDICATION/HISTORY:  Acute renal failure. Assess for obstruction.  -Additional: None. COMPARISON: Abdominopelvic CT 06/16/2017.     TECHNIQUE: Unenhanced, renal protocol CT examination of the abdomen and pelvis  was performed without intravenous or oral contrast. This examination is limited  for the evaluation of solid organs and vascular structures due to lack of  intravenous contrast, which is standard for urinary calculus assessment CT. Evaluation of the bowel is limited without oral contrast.   _______________    One or more dose reduction techniques (e.g. automated exposure control,  adjustment of the mA and/or kV according to patient size, use of iterative  reconstruction technique) were utilized for this examination. Digital Imaging  and Communications in Medicine (DICOM) format image data are available to  nonaffiliated external healthcare facilities or entities on a secure, media  free, reciprocally searchable basis with patient authorization for at least a  12-month period after this study. _______________    FINDINGS:    VISUALIZED LUNG BASES and LOWER CHEST: Linear scarring and/or atelectasis left  base. The heart is top normal in size noting a moderate pericardial effusion,  new from prior. LIVER: Normal in size and contour. Stable scattered cysts. GALLBLADDER and BILE DUCTS: Cholelithiasis without evidence of acute  cholecystitis. No intrahepatic or extrahepatic biliary ductal dilatation. PANCREAS: Within normal limits. SPLEEN: Within normal limits. ADRENALS: Within normal limits. KIDNEYS, URETERS, URINARY BLADDER: Moderate left renal atrophy, similar to prior  again noting bilateral extrarenal pelves. No hydronephrosis. Punctate  nonobstructive inferior left renal calculus. No ureterectasis. Urinary bladder  is predominantly decompressed about a suprapubic catheter, noting dependent  calcification of known intraluminal urinary bladder calculi. REPRODUCTIVE ORGANS: No abnormal pelvic masses. STOMACH and BOWEL: Evaluation of the bowel is limited in the absence of oral  contrast. Bowel caliber is normal without evidence of obstruction or focal  inflammatory process. LYMPH NODES: No lymphadenopathy. PERITONEUM: No free fluid. No extraluminal free air or drainable collection.     VESSELS: Aortoiliac atherosclerotic changes without aneurysm. BODY WALL: Small fat-containing umbilical hernia. Small fat-containing left  ventricle mid abdominal wall hernia (image 104) right, stable. BONES: Stable degenerative changes with no acute or suspicious osseous findings. _______________      Impression IMPRESSION:    1. No obstructive uropathy or acute findings in the abdomen or pelvis, with  multiple chronic findings described in the report not significantly changed  compared with 06/16/2017 CT imaging. 2. Moderate size pericardial effusion, new from prior. _______________       MRI Results:  No results found for this or any previous visit. Nuclear Medicine Results:  Results from [place] encounter on 09/29/20   NM LUNG SCAN PERF    Narrative EXAM: NM LUNG SCAN PERF. CLINICAL INDICATION/HISTORY: Assess for PE.  -Additional: Clinical concern for pulmonary embolus. COMPARISON: Correlation is made with the radiographic study performed the same  day. TECHNIQUE: 7.2 mCi Tc-99m MAA was injected intravenously and the 8 standard  views of the chest were obtained. This perfusion only examination is interpreted  using the PISAPED criteria.   _______________    FINDINGS:    Perfusion images show relatively symmetric radiotracer distribution to both  lungs, with no segmental perfusion defects to suggest the presence of pulmonary  embolism. Cardiac prominence and elevation the right hemidiaphragm are noted. _______________      Impression IMPRESSION:    Normal/near normal perfusion. No scintigraphic findings to suggest the presence  of acute pulmonary embolism. _______________     Note: Preliminary report sent to the Emergency Department by the radiology  resident at the time of the study. _______________          US Results:  No results found for this or any previous visit. IR Results:  No results found for this or any previous visit.     VAS/US Results:  No results found for this or any previous visit. Total discharge time 35 minutes of which more than 50 % spent on counseling and coordination of care. This document in whole or part of it has been produced using voice recognition software. Unrecognized errors in transcription may be present. Flor Main MD  Hospitalist  [facility]. medical group. Hospitalist Division.   October 5, 2020  8:18 AM

## 2020-10-05 NOTE — PROGRESS NOTES
9515 Discharge instructions prepared per MD orders on behalf of primary nurse Danyelle Beckham. Olya Mejia Discharge pending pt being seen by PT first and urine culture done per orders. [phone] Discharge instructions provided to pt on behalf of primary nurse Danyelle Beckham. No new prescriptions ordered. Peripheral iv and telemetry monitor removed. Changed chronic quijano cathter to a leg bag. Belongings packed. Pt dressed self. Pt called someone to pick her up. Pt declined to eat lunch here. 0 Pt escorted via wheelchair to car to be driven by cousin. Pt left in stable condition.

## 2020-10-05 NOTE — PROGRESS NOTES
Problem: Mobility Impaired (Adult and Pediatric)  Goal: *Acute Goals and Plan of Care (Insert Text)  10/5/2020 1007 by Seema Asher PT  Outcome: Resolved/Met  PHYSICAL THERAPY RE-EVALUATION AND DISCHARGE    Patient: Sonia Mejias (83 y.o. female)  Date: 10/5/2020  Primary Diagnosis: Stage 3 acute kidney injury ([facility] [N17.9]  Precautions: Fall  PLOF: Independent  ASSESSMENT :  Re-evaluation s/p new orders; goals reviewed. Previously evaluated on 10/2/2020 and discharged d/t independent mobility. Reports minimal mobility since discharge; amb in fonseca once. Educated on role of mobility and need for OOB activity. Mod I for supine to sit. Seated EOB with good balance. Mod I for sit to stand. Amb 100ft with mod I/I; uses IV pole intermittently. Standing rest break d/t elevated HR with activity.  prior to mobility; 117 post mobility. Returned to seated in recliner. Educated on need for RN assistance with mobility d/t IV line; verbalized understanding. Call bell in reach. Further skilled physical therapy is not indicated at this time. PLAN :  Recommendations and Planned Interventions:   Further skilled physical therapy is not indicated. Discharge Recommendations: None  Further Equipment Recommendations for Discharge: None     SUBJECTIVE:   Patient stated They didn't want me up.     OBJECTIVE DATA SUMMARY:     Past Medical History:   Diagnosis Date    Abdominal wall mass     (Cancer?) S/P Excision    Baker's cyst of knee, left     Bell palsy     Benign hypertensive CKD, stage 1-4 or unspecified chronic kidney disease     CKD Stage II-IIIA    Cancer (HCC)     abdominal    Cylindrical bronchiectasis (HCC)     Predominantly Lower lobe(s)    Grade I diastolic dysfunction 11/38/6083    By TTE    Hiatal hernia     Small    History of GI bleed 06/16/2017    Hypertension     Kidney stones     Left lower lobe pulmonary nodule     Subpleural    Menopause     Neurogenic bladder     Osteopenia 06/08/2016    By [facility]    Recurrent UTI     Suprapubic catheter ([address]     2/2 Urinary Retention, Neurogenic Bladder, and Urethral Stricture    Ureteral stricture     Urinary retention      Past Surgical History:   Procedure Laterality Date    ABDOMEN SURGERY PROC UNLISTED      Wide local excison of abdominal wall mass 7/5/2016    COLONOSCOPY N/A 7/28/2017    COLONOSCOPY performed by Ren Loving MD at [address] HX APPENDECTOMY      HX COLONOSCOPY      HX COLOSTOMY  2008    HX HYSTERECTOMY  1980's    fibroids    HX UROLOGICAL  2012    suprapubic tube placement       Critical Behavior:  Neurologic State: Alert  Orientation Level: Oriented X4  Cognition: Follows commands     Psychosocial  Patient Behaviors: Cooperative    Strength:    Manual Muscle Testing (LE)         R     L    Hip Flexion:   4+/5  4+/5  Knee EXT:   4+/5  4+/5  Knee FLEX:   4+/5  4+/5  Ankle DF:   4+/5  4+/5  _________________________________________________   Range Of Motion:  BLE AROM WFL  Functional Mobility:  Bed Mobility:  Supine to Sit: Modified independent  Transfers:  Sit to Stand: Modified independent  Stand to Sit: Modified independent  Balance:   Sitting: Intact  Standing: Impaired  Standing - Static: Good  Standing - Dynamic : Good  Ambulation/Gait Training:  Distance (ft): 100 Feet (ft)   Ambulation - Level of Assistance: Independent; Modified independent    Pain:  Pain level pre-treatment: 0/10   Pain level post-treatment: 0/10     Activity Tolerance:   Good    After treatment:   [x]         Patient left in no apparent distress sitting up in chair  []         Patient left in no apparent distress in bed  [x]         Call bell left within reach  [x]         Nursing notified  []         Caregiver present  []         Bed alarm activated  []         SCDs applied    COMMUNICATION/EDUCATION:   [x]         Role of physical therapy in the acute care setting.   [x]         Fall prevention education was provided and the patient/caregiver indicated understanding. [x]         Patient/family have participated as able in goal setting and plan of care. [x]         Patient/family agree to work toward stated goals and plan of care. []         Patient understands intent and goals of therapy, but is neutral about his/her participation. []         Patient is unable to participate in goal setting/plan of care: ongoing with therapy staff.     Thank you for this referral.  Lysbeth Runner, PT   Time Calculation: 12 mins

## 2020-10-05 NOTE — PROGRESS NOTES
RENAL PROGRESS NOTE        Penny Vigil         Assessment/Plan:   · SOLITARIO (volume depletion in a setting of resp infection/uti/severe anemia). Improving, d/c ivf. · HTN. Better controlled, continue current regimen. · Resp infection, covid is being r/o. · Complicated uti in a setting of chronic suprapubic catheter. On abx. BC pos for coag neg staph. Contaminant? Repeat bc ngtd. · Severe anemia. Etio? No overt bleeding. H/H is up with transfusion. W/u per primary team.    · If d/gavin, f/u with pcp Dr. Myesha Gaona. F/u with me if needed. Subjective:  Patient complaints off: Feels better. No SOB/CP/N/V. Appetite is better. Ambulated in the hallway.        Patient Active Problem List   Diagnosis Code    Neurogenic bladder N31.9    Recurrent UTI N39.0    Stricture, urethra N35.919    Bladder stones N21.0    Colitis, nonspecific K52.9    Rectal bleeding K62.5    Stage 3 acute kidney injury ([address] N17.9    UTI (urinary tract infection) N39.0    Symptomatic anemia D64.9    Elevated d-dimer R79.89    Thrombocytosis (HCC) D47.3    CKD (chronic kidney disease) N18.9    Abnormal antibody titer R76.0       Current Facility-Administered Medications   Medication Dose Route Frequency Provider Last Rate Last Dose    dilTIAZem ER (CARDIZEM CD) capsule 240 mg  240 mg Oral DAILY Juan Lemus MD   240 mg at 10/05/20 0855    heparin (porcine) injection 5,000 Units  5,000 Units SubCUTAneous Q8H Juan Lemus MD   5,000 Units at 10/05/20 0528    0.9% sodium chloride infusion 250 mL  250 mL IntraVENous PRN Jayden Kothari DO        aspirin chewable tablet 81 mg  81 mg Oral DAILY Rodrigo Ovalle, DO   81 mg at 10/05/20 0855    hydrALAZINE (APRESOLINE) tablet 50 mg  50 mg Oral TID Chente Coronado E, DO   50 mg at 10/05/20 0855    montelukast (SINGULAIR) tablet 10 mg  10 mg Oral QHS Kianna Davila, DO   10 mg at 10/04/20 2126    therapeutic multivitamin [facility]) tablet 1 Tab  1 Tab Oral DAILY Kianna Davila DO   1 Tab at 10/05/20 0855    sodium chloride (NS) flush 5-40 mL  5-40 mL IntraVENous Q8H Rodrigo Ovalle, DO   10 mL at 10/05/20 0528    sodium chloride (NS) flush 5-40 mL  5-40 mL IntraVENous PRN Kianna Davila DO        ondansetron [facility]) injection 4 mg  4 mg IntraVENous Q6H PRN Kianna Davila, DO        acetaminophen (TYLENOL) tablet 650 mg  650 mg Oral Q6H PRN Kianna Davila, DO   650 mg at 10/01/20 2145    melatonin tablet 12 mg  12 mg Oral QHS PRN Nani MENA, DO   12 mg at 10/02/20 2230    docusate sodium (COLACE) capsule 100 mg  100 mg Oral BID PRN Kianna Davila DO        albuterol-ipratropium (DUO-NEB) 2.5 MG-0.5 MG/3 ML  3 mL Nebulization Q6H PRN Rodrigo Ovalle DO        pantoprazole (PROTONIX) tablet 40 mg  40 mg Oral DAILY PRN Hunter Kendall MD        0.9% sodium chloride infusion 250 mL  250 mL IntraVENous PRN Nani MENA DO           Objective  Vitals:    10/04/20 2010 10/04/20 2126 10/04/20 2353 10/05/20 0528   BP: 132/75 (!) 140/77 (!) 154/73 (!) 147/71   Pulse: (!) 106 99 (!) 107 98   Resp: 18 18 18 16   Temp: 100.2 °F (37.9 °C) 100.2 °F (37.9 °C) 99.6 °F (37.6 °C) 99.5 °F (37.5 °C)   SpO2: 98% 96% 95% 97%   Weight:       Height:             Intake/Output Summary (Last 24 hours) at 10/5/2020 0857  Last data filed at 10/5/2020 0534  Gross per 24 hour   Intake 1134.17 ml   Output 2175 ml   Net -1040.83 ml           Admission weight: Weight: 72.1 kg (159 lb) (09/30/20 0800)  Last Weight Metrics:  Weight Loss Metrics 9/30/2020 9/24/2020 5/28/2020 2/26/2020 1/20/2020 2/19/2019 2/6/2019   Today's Wt 159 lb 153 lb 160 lb 170 lb 170 lb 174 lb 174 lb   BMI 28.17 kg/m2 27.1 kg/m2 28.34 kg/m2 30.11 kg/m2 30.11 kg/m2 30.82 kg/m2 30.82 kg/m2             Physical Assessment:     General: NAD, alert and oriented. Neck: No jvd.   LUNGS: Clear to Auscultation, No rales, rhonchi or wheezes. CVS EXM: S1, S2  RRR. Abdomen: soft, non tender. Suprapubic catheter in place. Lower Extremities:  no edema. Lab    CBC w/Diff Recent Labs     10/05/20  0310 10/04/20  0702 10/03/20  0642   WBC 7.0 7.0 7.5   RBC 2.95* 3.03* 2.94*   HGB 7.9* 8.2* 8.0*   HCT 25.5* 26.3* 25.6*    415 403   GRANS 73  --   --    LYMPH 20*  --   --    EOS 1  --   --         Chemistry Recent Labs     10/05/20  0310 10/04/20  0702 10/03/20  0642   GLU 85 78 77    142 142   K 3.6 3.6 3.8    111 112*   CO2 24 23 22   BUN 21* 23* 22*   CREA 1.51* 1.54* 1.61*   CA 8.0* 8.1* 7.8*   AGAP 7 8 8   BUCR 14 15 14   ALB 2.2* 2.2* 2.1*   PHOS 4.1 4.3 4.0         Lab Results   Component Value Date/Time    Iron 58 09/27/2010 12:37 PM    Ferritin 584 (H) 09/27/2010 12:37 PM      Lab Results   Component Value Date/Time    Calcium 8.0 (L) 10/05/2020 03:10 AM    Phosphorus 4.1 10/05/2020 03:10 AM        Zoya Phillips M.D.   [facility]  Phone

## 2020-10-05 NOTE — PROGRESS NOTES
Problem: Infection - Risk of, Urinary Catheter-Associated Urinary Tract Infection  Goal: *Absence of infection signs and symptoms  Outcome: Resolved/Met     Problem: Patient Education: Go to Patient Education Activity  Goal: Patient/Family Education  Outcome: Resolved/Met     Problem: Falls - Risk of  Goal: *Absence of Falls  Description: Document Alana Fall Risk and appropriate interventions in the flowsheet. Outcome: Resolved/Met  Note: Fall Risk Interventions:  Mobility Interventions: Bed/chair exit alarm, Communicate number of staff needed for ambulation/transfer, OT consult for ADLs, Patient to call before getting OOB, PT Consult for mobility concerns         Medication Interventions: Patient to call before getting OOB    Elimination Interventions: Call light in reach, Patient to call for help with toileting needs              Problem: Patient Education: Go to Patient Education Activity  Goal: Patient/Family Education  Outcome: Resolved/Met     Problem: Pressure Injury - Risk of  Goal: *Prevention of pressure injury  Description: Document Wily Scale and appropriate interventions in the flowsheet. Outcome: Resolved/Met  Note: Pressure Injury Interventions:             Activity Interventions: Pressure redistribution bed/mattress(bed type)    Mobility Interventions: HOB 30 degrees or less, Pressure redistribution bed/mattress (bed type), PT/OT evaluation    Nutrition Interventions: Document food/fluid/supplement intake    Friction and Shear Interventions: HOB 30 degrees or less                Problem: Patient Education: Go to Patient Education Activity  Goal: Patient/Family Education  Outcome: Resolved/Met     Problem: Nutrition Deficit  Goal: *Optimize nutritional status  Outcome: Resolved/Met     Problem: Patient Education: Go to Patient Education Activity  Goal: Patient/Family Education  Outcome: Resolved/Met     Problem: Discharge Planning  Goal: *Discharge to safe environment  Outcome: Resolved/Met Problem: Pain  Goal: *Control of Pain  Outcome: Resolved/Met  Goal: *PALLIATIVE CARE:  Alleviation of Pain  Outcome: Resolved/Met     Problem: Patient Education: Go to Patient Education Activity  Goal: Patient/Family Education  Outcome: Resolved/Met     Problem: Patient Education: Go to Patient Education Activity  Goal: Patient/Family Education  Outcome: Resolved/Met     Problem: Patient Education: Go to Patient Education Activity  Goal: Patient/Family Education  Outcome: Resolved/Met

## 2020-10-07 LAB
BACTERIA SPEC CULT: NORMAL
CC UR VC: NORMAL
SERVICE CMNT-IMP: NORMAL

## 2020-10-08 LAB
BACTERIA SPEC CULT: NORMAL
BACTERIA SPEC CULT: NORMAL
SERVICE CMNT-IMP: NORMAL
SERVICE CMNT-IMP: NORMAL

## 2020-10-16 ENCOUNTER — TRANSCRIBE ORDER (OUTPATIENT)
Dept: SCHEDULING | Age: 75
End: 2020-10-16

## 2020-10-16 DIAGNOSIS — I31.39 EFFUSION, PERICARDIUM: Primary | ICD-10-CM

## 2020-10-16 DIAGNOSIS — R60.0 LEG EDEMA: Primary | ICD-10-CM

## 2020-10-16 DIAGNOSIS — J90 EXUDATIVE PLEURISY: ICD-10-CM

## 2020-10-19 ENCOUNTER — HOSPITAL ENCOUNTER (OUTPATIENT)
Dept: VASCULAR SURGERY | Age: 75
Discharge: HOME | End: 2020-10-19
Attending: INTERNAL MEDICINE
Payer: MEDICARE

## 2020-10-19 DIAGNOSIS — R60.0 LEG EDEMA: ICD-10-CM

## 2020-10-19 PROCEDURE — 93970 EXTREMITY STUDY: CPT

## 2020-11-02 ENCOUNTER — HOSPITAL ENCOUNTER (OUTPATIENT)
Dept: NON INVASIVE DIAGNOSTICS | Age: 75
Discharge: HOME | End: 2020-11-02
Attending: INTERNAL MEDICINE
Payer: MEDICARE

## 2020-11-02 VITALS
HEIGHT: 63 IN | WEIGHT: 159 LBS | BODY MASS INDEX: 28.17 KG/M2 | DIASTOLIC BLOOD PRESSURE: 75 MMHG | SYSTOLIC BLOOD PRESSURE: 154 MMHG

## 2020-11-02 DIAGNOSIS — J90 EXUDATIVE PLEURISY: ICD-10-CM

## 2020-11-02 DIAGNOSIS — I31.39 EFFUSION, PERICARDIUM: ICD-10-CM

## 2020-11-02 LAB
ECHO AO ARCH DIAM: 2.1 CM
ECHO AO ROOT DIAM: 2.41 CM
ECHO AO ST JNCT DIAM: 2.84 CM
ECHO IVC PROX: 1.62 CM
ECHO LA AREA 4C: 23.73 CM2
ECHO LA MAJOR AXIS: 5.25 CM
ECHO LA MINOR AXIS: 2.99 CM
ECHO LA VOL 2C: 91.09 ML (ref 22–52)
ECHO LA VOL 4C: 75.61 ML (ref 22–52)
ECHO LA VOL BP: 87.3 ML (ref 22–52)
ECHO LA VOL/BSA BIPLANE: 49.77 ML/M2 (ref 16–28)
ECHO LA VOLUME INDEX A2C: 51.93 ML/M2 (ref 16–28)
ECHO LA VOLUME INDEX A4C: 43.1 ML/M2 (ref 16–28)
ECHO LV E' LATERAL VELOCITY: 11.21 CM/S
ECHO LV E' SEPTAL VELOCITY: 6.06 CM/S
ECHO LV EDV A2C: 91.39 ML
ECHO LV EDV A4C: 109.56 ML
ECHO LV EDV BP: 100.34 ML (ref 56–104)
ECHO LV EDV INDEX A4C: 62.5 ML/M2
ECHO LV EDV INDEX BP: 57.2 ML/M2
ECHO LV EDV NDEX A2C: 52.1 ML/M2
ECHO LV EJECTION FRACTION A2C: 56 PERCENT
ECHO LV EJECTION FRACTION A4C: 58 PERCENT
ECHO LV EJECTION FRACTION BIPLANE: 56.7 PERCENT (ref 55–100)
ECHO LV ESV A2C: 39.99 ML
ECHO LV ESV A4C: 45.65 ML
ECHO LV ESV BP: 43.41 ML (ref 19–49)
ECHO LV ESV INDEX A2C: 22.8 ML/M2
ECHO LV ESV INDEX A4C: 26 ML/M2
ECHO LV ESV INDEX BP: 24.7 ML/M2
ECHO LV INTERNAL DIMENSION DIASTOLIC: 3.67 CM (ref 3.9–5.3)
ECHO LV INTERNAL DIMENSION SYSTOLIC: 2.74 CM
ECHO LV IVSD: 1.04 CM (ref 0.6–0.9)
ECHO LV MASS 2D: 89 G (ref 67–162)
ECHO LV MASS INDEX 2D: 50.8 G/M2 (ref 43–95)
ECHO LV POSTERIOR WALL DIASTOLIC: 0.67 CM (ref 0.6–0.9)
ECHO LVOT DIAM: 1.84 CM
ECHO LVOT PEAK GRADIENT: 4.02 MMHG
ECHO LVOT SV: 66.2 ML
ECHO LVOT VTI: 24.96 CM
ECHO MV A VELOCITY: 100.28 CM/S
ECHO MV E DECELERATION TIME (DT): 146.5 MS
ECHO MV E VELOCITY: 95.8 CM/S
ECHO MV E/A RATIO: 0.96
ECHO MV E/E' LATERAL: 8.55
ECHO MV E/E' RATIO (AVERAGED): 12.18
ECHO MV E/E' SEPTAL: 15.81
ECHO RV TAPSE: 1.5 CM (ref 1.5–2)
ECHO TV REGURGITANT MAX VELOCITY: 287 CM/S
ECHO TV REGURGITANT PEAK GRADIENT: 32.9 MMHG
LVOT MG: 2.25 MMHG

## 2020-11-02 PROCEDURE — 93306 TTE W/DOPPLER COMPLETE: CPT

## 2021-08-06 ENCOUNTER — HOSPITAL ENCOUNTER (OUTPATIENT)
Dept: WOMENS IMAGING | Age: 76
Discharge: HOME | End: 2021-08-06
Payer: MEDICARE

## 2021-08-06 DIAGNOSIS — Z12.31 BREAST CANCER SCREENING BY MAMMOGRAM: ICD-10-CM

## 2021-08-06 PROCEDURE — 77067 SCR MAMMO BI INCL CAD: CPT

## 2022-03-18 PROBLEM — D64.9 SYMPTOMATIC ANEMIA: Status: ACTIVE | Noted: 2020-09-29

## 2022-03-18 PROBLEM — N18.9 CKD (CHRONIC KIDNEY DISEASE): Status: ACTIVE | Noted: 2020-09-30

## 2022-03-19 PROBLEM — R79.89 ELEVATED D-DIMER: Status: ACTIVE | Noted: 2020-09-29

## 2022-03-19 PROBLEM — D75.839 THROMBOCYTOSIS: Status: ACTIVE | Noted: 2020-09-29

## 2022-03-19 PROBLEM — N39.0 UTI (URINARY TRACT INFECTION): Status: ACTIVE | Noted: 2020-09-29

## 2022-03-19 PROBLEM — K52.9 COLITIS, NONSPECIFIC: Status: ACTIVE | Noted: 2017-07-10

## 2022-03-19 PROBLEM — R76.0 ABNORMAL ANTIBODY TITER: Status: ACTIVE | Noted: 2020-09-30

## 2022-03-19 PROBLEM — K62.5 RECTAL BLEEDING: Status: ACTIVE | Noted: 2017-07-10

## 2022-03-20 PROBLEM — N17.9 STAGE 3 ACUTE KIDNEY INJURY (HCC): Status: ACTIVE | Noted: 2020-09-29

## 2022-10-28 ENCOUNTER — TRANSCRIBE ORDER (OUTPATIENT)
Dept: SCHEDULING | Age: 77
End: 2022-10-28

## 2022-10-28 DIAGNOSIS — N95.1 SYMPTOMATIC MENOPAUSAL OR FEMALE CLIMACTERIC STATES: Primary | ICD-10-CM

## 2022-10-28 DIAGNOSIS — Z12.31 VISIT FOR SCREENING MAMMOGRAM: Primary | ICD-10-CM

## 2022-10-28 DIAGNOSIS — M81.0 AGE-RELATED OSTEOPOROSIS WITHOUT CURRENT PATHOLOGICAL FRACTURE: ICD-10-CM

## 2022-12-05 ENCOUNTER — HOSPITAL ENCOUNTER (OUTPATIENT)
Dept: WOMENS IMAGING | Age: 77
Discharge: HOME | End: 2022-12-05
Attending: INTERNAL MEDICINE
Payer: MEDICARE

## 2022-12-05 ENCOUNTER — HOSPITAL ENCOUNTER (OUTPATIENT)
Dept: BONE DENSITY | Age: 77
Discharge: HOME | End: 2022-12-05
Attending: INTERNAL MEDICINE
Payer: MEDICARE

## 2022-12-05 DIAGNOSIS — Z12.31 VISIT FOR SCREENING MAMMOGRAM: ICD-10-CM

## 2022-12-05 DIAGNOSIS — M81.0 AGE-RELATED OSTEOPOROSIS WITHOUT CURRENT PATHOLOGICAL FRACTURE: ICD-10-CM

## 2022-12-05 DIAGNOSIS — N95.1 SYMPTOMATIC MENOPAUSAL OR FEMALE CLIMACTERIC STATES: ICD-10-CM

## 2022-12-05 PROCEDURE — 77080 DXA BONE DENSITY AXIAL: CPT

## 2022-12-05 PROCEDURE — 77063 BREAST TOMOSYNTHESIS BI: CPT

## 2023-01-31 DIAGNOSIS — Z12.31 ENCOUNTER FOR SCREENING MAMMOGRAM FOR BREAST CANCER: Primary | ICD-10-CM

## 2023-02-03 DIAGNOSIS — Z12.31 ENCOUNTER FOR SCREENING MAMMOGRAM FOR BREAST CANCER: Primary | ICD-10-CM

## 2023-02-06 DIAGNOSIS — Z12.31 ENCOUNTER FOR SCREENING MAMMOGRAM FOR BREAST CANCER: Primary | ICD-10-CM

## (undated) DEVICE — FLEX ADVANTAGE 1500CC: Brand: FLEX ADVANTAGE

## (undated) DEVICE — KIT COLON W/ 1.1OZ LUB AND 2 END

## (undated) DEVICE — SYR 50ML SLIP TIP NSAF LF STRL --

## (undated) DEVICE — DEVICE INFL 60ML 12ATM CONVENIENT LOK REL HNDL HI PRSS FLX

## (undated) DEVICE — MEDI-VAC NON-CONDUCTIVE SUCTION TUBING: Brand: CARDINAL HEALTH

## (undated) DEVICE — BASIN EMESIS 500CC ROSE 250/CS 60/PLT: Brand: MEDEGEN MEDICAL PRODUCTS, LLC

## (undated) DEVICE — KENDALL 500 SERIES DIAPHORETIC FOAM MONITORING ELECTRODE - TEAR DROP SHAPE ( 30/PK): Brand: KENDALL